# Patient Record
Sex: MALE | Race: WHITE | URBAN - METROPOLITAN AREA
[De-identification: names, ages, dates, MRNs, and addresses within clinical notes are randomized per-mention and may not be internally consistent; named-entity substitution may affect disease eponyms.]

---

## 2017-04-04 RX ORDER — FAMOTIDINE 20 MG
1 TABLET ORAL DAILY
COMMUNITY

## 2017-04-14 NOTE — PHARMACY-ADMISSION MEDICATION HISTORY
Admission medication history interview status for this patient is complete. See [facility] admission navigator for allergy information, prior to admission medications and immunization status.     Med rec completed by pre admitting Kenia Eid RN Tue Apr 4, 2017 10:16 AM           Prior to Admission medications    Medication Sig Last Dose Taking? Auth Provider   AMLODIPINE BESYLATE PO Take 2.5 mg by mouth daily  Yes Reported, Patient   LOSARTAN POTASSIUM PO Take 100 mg by mouth daily  Yes Reported, Patient   METOPROLOL TARTRATE PO Take 25 mg by mouth 2 times daily  Yes Reported, Patient   Acetaminophen (TYLENOL PO) Take 500 mg by mouth every 6 hours as needed for mild pain or fever  Yes Reported, Patient   ASPIRIN EC PO Take 81 mg by mouth daily  Yes Reported, Patient   Vitamin D, Cholecalciferol, 1000 UNITS CAPS Take 1 tablet by mouth daily  Yes Reported, Patient   magnesium 100 MG TABS Take 1 tablet by mouth daily  Yes Reported, Patient   RaNITidine HCl (ZANTAC PO) Take 150 mg by mouth At Bedtime  Yes Reported, Patient   ROPINIROLE HCL PO Take 1 mg by mouth 3 times daily  Yes Reported, Patient

## 2017-04-20 ENCOUNTER — ANESTHESIA (OUTPATIENT)
Dept: SURGERY | Facility: CLINIC | Age: 67
DRG: 454 | End: 2017-04-20
Payer: COMMERCIAL

## 2017-04-20 ENCOUNTER — HOSPITAL ENCOUNTER (INPATIENT)
Facility: CLINIC | Age: 67
LOS: 1 days | Discharge: HOME | DRG: 454 | End: 2017-04-21
Attending: NEUROLOGICAL SURGERY | Admitting: NEUROLOGICAL SURGERY
Payer: COMMERCIAL

## 2017-04-20 ENCOUNTER — ANESTHESIA EVENT (OUTPATIENT)
Dept: SURGERY | Facility: CLINIC | Age: 67
DRG: 454 | End: 2017-04-20
Payer: COMMERCIAL

## 2017-04-20 ENCOUNTER — APPOINTMENT (OUTPATIENT)
Dept: GENERAL RADIOLOGY | Facility: CLINIC | Age: 67
DRG: 454 | End: 2017-04-20
Attending: NEUROLOGICAL SURGERY
Payer: COMMERCIAL

## 2017-04-20 DIAGNOSIS — M41.9 SCOLIOSIS OF LUMBOSACRAL SPINE, UNSPECIFIED SCOLIOSIS TYPE: Primary | ICD-10-CM

## 2017-04-20 DIAGNOSIS — G89.18 ACUTE POST-OPERATIVE PAIN: ICD-10-CM

## 2017-04-20 LAB
ABO + RH BLD: NORMAL
ABO + RH BLD: NORMAL
BLD GP AB SCN SERPL QL: NORMAL
BLOOD BANK CMNT PATIENT-IMP: NORMAL
SPECIMEN EXP DATE BLD: NORMAL

## 2017-04-20 PROCEDURE — 37000008 ZZH ANESTHESIA TECHNICAL FEE, 1ST 30 MIN: Performed by: NEUROLOGICAL SURGERY

## 2017-04-20 PROCEDURE — 71000013 ZZH RECOVERY PHASE 1 LEVEL 1 EA ADDTL HR: Performed by: NEUROLOGICAL SURGERY

## 2017-04-20 PROCEDURE — 0SG30AJ FUSION OF LUMBOSACRAL JOINT WITH INTERBODY FUSION DEVICE, POSTERIOR APPROACH, ANTERIOR COLUMN, OPEN APPROACH: ICD-10-PCS | Performed by: NEUROLOGICAL SURGERY

## 2017-04-20 PROCEDURE — 25800025 ZZH RX 258: Performed by: ANESTHESIOLOGY

## 2017-04-20 PROCEDURE — 37000009 ZZH ANESTHESIA TECHNICAL FEE, EACH ADDTL 15 MIN: Performed by: NEUROLOGICAL SURGERY

## 2017-04-20 PROCEDURE — 36415 COLL VENOUS BLD VENIPUNCTURE: CPT | Performed by: ANESTHESIOLOGY

## 2017-04-20 PROCEDURE — 0SB40ZZ EXCISION OF LUMBOSACRAL DISC, OPEN APPROACH: ICD-10-PCS | Performed by: NEUROLOGICAL SURGERY

## 2017-04-20 PROCEDURE — 25000128 H RX IP 250 OP 636: Performed by: NEUROLOGICAL SURGERY

## 2017-04-20 PROCEDURE — 25000125 ZZHC RX 250: Performed by: ANESTHESIOLOGY

## 2017-04-20 PROCEDURE — 95938 SOMATOSENSORY TESTING: CPT | Performed by: NEUROLOGICAL SURGERY

## 2017-04-20 PROCEDURE — 25000132 ZZH RX MED GY IP 250 OP 250 PS 637: Mod: GY | Performed by: NEUROLOGICAL SURGERY

## 2017-04-20 PROCEDURE — 0SG30K1 FUSION OF LUMBOSACRAL JOINT WITH NONAUTOLOGOUS TISSUE SUBSTITUTE, POSTERIOR APPROACH, POSTERIOR COLUMN, OPEN APPROACH: ICD-10-PCS | Performed by: NEUROLOGICAL SURGERY

## 2017-04-20 PROCEDURE — 25000128 H RX IP 250 OP 636: Performed by: ANESTHESIOLOGY

## 2017-04-20 PROCEDURE — 07DS3ZZ EXTRACTION OF VERTEBRAL BONE MARROW, PERCUTANEOUS APPROACH: ICD-10-PCS | Performed by: NEUROLOGICAL SURGERY

## 2017-04-20 PROCEDURE — A9270 NON-COVERED ITEM OR SERVICE: HCPCS | Mod: GY | Performed by: NEUROLOGICAL SURGERY

## 2017-04-20 PROCEDURE — 36000069 ZZH SURGERY LEVEL 5 EA 15 ADDTL MIN: Performed by: NEUROLOGICAL SURGERY

## 2017-04-20 PROCEDURE — 40000277 XR SURGERY CARM FLUORO LESS THAN 5 MIN W STILLS: Mod: TC

## 2017-04-20 PROCEDURE — 27210794 ZZH OR GENERAL SUPPLY STERILE: Performed by: NEUROLOGICAL SURGERY

## 2017-04-20 PROCEDURE — S0020 INJECTION, BUPIVICAINE HYDRO: HCPCS | Performed by: NEUROLOGICAL SURGERY

## 2017-04-20 PROCEDURE — 3E0S33Z INTRODUCTION OF ANTI-INFLAMMATORY INTO EPIDURAL SPACE, PERCUTANEOUS APPROACH: ICD-10-PCS | Performed by: NEUROLOGICAL SURGERY

## 2017-04-20 PROCEDURE — 0SG00A0 FUSION OF LUMBAR VERTEBRAL JOINT WITH INTERBODY FUSION DEVICE, ANTERIOR APPROACH, ANTERIOR COLUMN, OPEN APPROACH: ICD-10-PCS | Performed by: NEUROLOGICAL SURGERY

## 2017-04-20 PROCEDURE — 25000128 H RX IP 250 OP 636: Performed by: NURSE ANESTHETIST, CERTIFIED REGISTERED

## 2017-04-20 PROCEDURE — C1713 ANCHOR/SCREW BN/BN,TIS/BN: HCPCS | Performed by: NEUROLOGICAL SURGERY

## 2017-04-20 PROCEDURE — 86850 RBC ANTIBODY SCREEN: CPT | Performed by: ANESTHESIOLOGY

## 2017-04-20 PROCEDURE — 71000012 ZZH RECOVERY PHASE 1 LEVEL 1 FIRST HR: Performed by: NEUROLOGICAL SURGERY

## 2017-04-20 PROCEDURE — 27210995 ZZH RX 272: Performed by: NEUROLOGICAL SURGERY

## 2017-04-20 PROCEDURE — 40000306 ZZH STATISTIC PRE PROC ASSESS II: Performed by: NEUROLOGICAL SURGERY

## 2017-04-20 PROCEDURE — 25000125 ZZHC RX 250: Performed by: NURSE ANESTHETIST, CERTIFIED REGISTERED

## 2017-04-20 PROCEDURE — A9270 NON-COVERED ITEM OR SERVICE: HCPCS | Mod: GY | Performed by: PHYSICIAN ASSISTANT

## 2017-04-20 PROCEDURE — 27211024 ZZHC OR SUPPLY OTHER OPNP: Performed by: NEUROLOGICAL SURGERY

## 2017-04-20 PROCEDURE — 27810322 ZZHC OR SPINE - CAGE/SPACER/DISK/CORD/CONNECTOR OPNP: Performed by: NEUROLOGICAL SURGERY

## 2017-04-20 PROCEDURE — 25000125 ZZHC RX 250: Performed by: NEUROLOGICAL SURGERY

## 2017-04-20 PROCEDURE — 86900 BLOOD TYPING SEROLOGIC ABO: CPT | Performed by: ANESTHESIOLOGY

## 2017-04-20 PROCEDURE — 0SG00K1 FUSION OF LUMBAR VERTEBRAL JOINT WITH NONAUTOLOGOUS TISSUE SUBSTITUTE, POSTERIOR APPROACH, POSTERIOR COLUMN, OPEN APPROACH: ICD-10-PCS | Performed by: NEUROLOGICAL SURGERY

## 2017-04-20 PROCEDURE — 0QP004Z REMOVAL OF INTERNAL FIXATION DEVICE FROM LUMBAR VERTEBRA, OPEN APPROACH: ICD-10-PCS | Performed by: NEUROLOGICAL SURGERY

## 2017-04-20 PROCEDURE — 0SB20ZZ EXCISION OF LUMBAR VERTEBRAL DISC, OPEN APPROACH: ICD-10-PCS | Performed by: NEUROLOGICAL SURGERY

## 2017-04-20 PROCEDURE — 25000132 ZZH RX MED GY IP 250 OP 250 PS 637: Mod: GY | Performed by: PHYSICIAN ASSISTANT

## 2017-04-20 PROCEDURE — 86901 BLOOD TYPING SEROLOGIC RH(D): CPT | Performed by: ANESTHESIOLOGY

## 2017-04-20 PROCEDURE — 36000071 ZZH SURGERY LEVEL 5 W FLUORO 1ST 30 MIN: Performed by: NEUROLOGICAL SURGERY

## 2017-04-20 PROCEDURE — 12000007 ZZH R&B INTERMEDIATE

## 2017-04-20 PROCEDURE — 25000566 ZZH SEVOFLURANE, EA 15 MIN: Performed by: NEUROLOGICAL SURGERY

## 2017-04-20 PROCEDURE — 95870 NDL EMG LMTD STD MUSC 1 XTR: CPT | Performed by: NEUROLOGICAL SURGERY

## 2017-04-20 DEVICE — IMPLANTABLE DEVICE: Type: IMPLANTABLE DEVICE | Site: SPINE LUMBAR | Status: FUNCTIONAL

## 2017-04-20 RX ORDER — ONDANSETRON 2 MG/ML
4 INJECTION INTRAMUSCULAR; INTRAVENOUS EVERY 30 MIN PRN
Status: DISCONTINUED | OUTPATIENT
Start: 2017-04-20 | End: 2017-04-20 | Stop reason: HOSPADM

## 2017-04-20 RX ORDER — ROPINIROLE 1 MG/1
1 TABLET, FILM COATED ORAL 3 TIMES DAILY
Status: DISCONTINUED | OUTPATIENT
Start: 2017-04-20 | End: 2017-04-21 | Stop reason: HOSPADM

## 2017-04-20 RX ORDER — FENTANYL CITRATE 50 UG/ML
25-50 INJECTION, SOLUTION INTRAMUSCULAR; INTRAVENOUS
Status: DISCONTINUED | OUTPATIENT
Start: 2017-04-20 | End: 2017-04-20 | Stop reason: HOSPADM

## 2017-04-20 RX ORDER — LIDOCAINE 40 MG/G
CREAM TOPICAL
Status: DISCONTINUED | OUTPATIENT
Start: 2017-04-20 | End: 2017-04-21 | Stop reason: HOSPADM

## 2017-04-20 RX ORDER — ONDANSETRON 4 MG/1
4 TABLET, ORALLY DISINTEGRATING ORAL EVERY 30 MIN PRN
Status: DISCONTINUED | OUTPATIENT
Start: 2017-04-20 | End: 2017-04-20 | Stop reason: HOSPADM

## 2017-04-20 RX ORDER — KETOROLAC TROMETHAMINE 15 MG/ML
15 INJECTION, SOLUTION INTRAMUSCULAR; INTRAVENOUS EVERY 6 HOURS
Status: DISCONTINUED | OUTPATIENT
Start: 2017-04-20 | End: 2017-04-21 | Stop reason: HOSPADM

## 2017-04-20 RX ORDER — HYDROMORPHONE HYDROCHLORIDE 1 MG/ML
.5-1 INJECTION, SOLUTION INTRAMUSCULAR; INTRAVENOUS; SUBCUTANEOUS
Status: DISCONTINUED | OUTPATIENT
Start: 2017-04-20 | End: 2017-04-21 | Stop reason: HOSPADM

## 2017-04-20 RX ORDER — NALOXONE HYDROCHLORIDE 0.4 MG/ML
.1-.4 INJECTION, SOLUTION INTRAMUSCULAR; INTRAVENOUS; SUBCUTANEOUS
Status: DISCONTINUED | OUTPATIENT
Start: 2017-04-20 | End: 2017-04-21 | Stop reason: HOSPADM

## 2017-04-20 RX ORDER — METOPROLOL TARTRATE 25 MG/1
25 TABLET, FILM COATED ORAL 2 TIMES DAILY
Status: DISCONTINUED | OUTPATIENT
Start: 2017-04-20 | End: 2017-04-21

## 2017-04-20 RX ORDER — ONDANSETRON 2 MG/ML
INJECTION INTRAMUSCULAR; INTRAVENOUS PRN
Status: DISCONTINUED | OUTPATIENT
Start: 2017-04-20 | End: 2017-04-20

## 2017-04-20 RX ORDER — NEOSTIGMINE METHYLSULFATE 1 MG/ML
VIAL (ML) INJECTION PRN
Status: DISCONTINUED | OUTPATIENT
Start: 2017-04-20 | End: 2017-04-20

## 2017-04-20 RX ORDER — FENTANYL CITRATE 50 UG/ML
INJECTION, SOLUTION INTRAMUSCULAR; INTRAVENOUS PRN
Status: DISCONTINUED | OUTPATIENT
Start: 2017-04-20 | End: 2017-04-20

## 2017-04-20 RX ORDER — PROPOFOL 10 MG/ML
INJECTION, EMULSION INTRAVENOUS PRN
Status: DISCONTINUED | OUTPATIENT
Start: 2017-04-20 | End: 2017-04-20

## 2017-04-20 RX ORDER — DEXAMETHASONE SODIUM PHOSPHATE 4 MG/ML
INJECTION, SOLUTION INTRA-ARTICULAR; INTRALESIONAL; INTRAMUSCULAR; INTRAVENOUS; SOFT TISSUE PRN
Status: DISCONTINUED | OUTPATIENT
Start: 2017-04-20 | End: 2017-04-20

## 2017-04-20 RX ORDER — EPHEDRINE SULFATE 50 MG/ML
INJECTION, SOLUTION INTRAMUSCULAR; INTRAVENOUS; SUBCUTANEOUS PRN
Status: DISCONTINUED | OUTPATIENT
Start: 2017-04-20 | End: 2017-04-20

## 2017-04-20 RX ORDER — CYCLOBENZAPRINE HCL 5 MG
5 TABLET ORAL 3 TIMES DAILY PRN
Qty: 42 TABLET | Refills: 3 | Status: SHIPPED | OUTPATIENT
Start: 2017-04-20

## 2017-04-20 RX ORDER — LIDOCAINE 40 MG/G
CREAM TOPICAL
Status: DISCONTINUED | OUTPATIENT
Start: 2017-04-20 | End: 2017-04-20 | Stop reason: HOSPADM

## 2017-04-20 RX ORDER — SODIUM CHLORIDE, SODIUM LACTATE, POTASSIUM CHLORIDE, CALCIUM CHLORIDE 600; 310; 30; 20 MG/100ML; MG/100ML; MG/100ML; MG/100ML
INJECTION, SOLUTION INTRAVENOUS CONTINUOUS
Status: DISCONTINUED | OUTPATIENT
Start: 2017-04-20 | End: 2017-04-20 | Stop reason: HOSPADM

## 2017-04-20 RX ORDER — CEFAZOLIN SODIUM 2 G/100ML
2 INJECTION, SOLUTION INTRAVENOUS
Status: COMPLETED | OUTPATIENT
Start: 2017-04-20 | End: 2017-04-20

## 2017-04-20 RX ORDER — OXYCODONE HYDROCHLORIDE 5 MG/1
5-10 TABLET ORAL EVERY 4 HOURS PRN
Status: DISCONTINUED | OUTPATIENT
Start: 2017-04-20 | End: 2017-04-21

## 2017-04-20 RX ORDER — HYDROMORPHONE HYDROCHLORIDE 1 MG/ML
.3-.5 INJECTION, SOLUTION INTRAMUSCULAR; INTRAVENOUS; SUBCUTANEOUS EVERY 5 MIN PRN
Status: DISCONTINUED | OUTPATIENT
Start: 2017-04-20 | End: 2017-04-20 | Stop reason: HOSPADM

## 2017-04-20 RX ORDER — ONDANSETRON 2 MG/ML
4 INJECTION INTRAMUSCULAR; INTRAVENOUS EVERY 6 HOURS PRN
Status: DISCONTINUED | OUTPATIENT
Start: 2017-04-20 | End: 2017-04-21 | Stop reason: HOSPADM

## 2017-04-20 RX ORDER — PROPOFOL 10 MG/ML
INJECTION, EMULSION INTRAVENOUS CONTINUOUS PRN
Status: DISCONTINUED | OUTPATIENT
Start: 2017-04-20 | End: 2017-04-20

## 2017-04-20 RX ORDER — LOSARTAN POTASSIUM 100 MG/1
100 TABLET ORAL DAILY
Status: DISCONTINUED | OUTPATIENT
Start: 2017-04-21 | End: 2017-04-21 | Stop reason: HOSPADM

## 2017-04-20 RX ORDER — GLYCOPYRROLATE 0.2 MG/ML
INJECTION, SOLUTION INTRAMUSCULAR; INTRAVENOUS PRN
Status: DISCONTINUED | OUTPATIENT
Start: 2017-04-20 | End: 2017-04-20

## 2017-04-20 RX ORDER — AMLODIPINE BESYLATE 2.5 MG/1
2.5 TABLET ORAL DAILY
Status: DISCONTINUED | OUTPATIENT
Start: 2017-04-21 | End: 2017-04-21 | Stop reason: HOSPADM

## 2017-04-20 RX ORDER — BUPIVACAINE HYDROCHLORIDE 7.5 MG/ML
INJECTION, SOLUTION EPIDURAL; RETROBULBAR PRN
Status: DISCONTINUED | OUTPATIENT
Start: 2017-04-20 | End: 2017-04-20 | Stop reason: HOSPADM

## 2017-04-20 RX ORDER — LABETALOL HYDROCHLORIDE 5 MG/ML
10 INJECTION, SOLUTION INTRAVENOUS
Status: DISCONTINUED | OUTPATIENT
Start: 2017-04-20 | End: 2017-04-20 | Stop reason: HOSPADM

## 2017-04-20 RX ORDER — SODIUM CHLORIDE AND POTASSIUM CHLORIDE 150; 450 MG/100ML; MG/100ML
INJECTION, SOLUTION INTRAVENOUS CONTINUOUS
Status: DISCONTINUED | OUTPATIENT
Start: 2017-04-20 | End: 2017-04-21

## 2017-04-20 RX ORDER — OXYCODONE HYDROCHLORIDE 5 MG/1
5-10 TABLET ORAL EVERY 4 HOURS PRN
Qty: 60 TABLET | Refills: 0 | Status: SHIPPED | OUTPATIENT
Start: 2017-04-20 | End: 2017-04-21

## 2017-04-20 RX ORDER — ACETAMINOPHEN 325 MG/1
650 TABLET ORAL EVERY 4 HOURS PRN
Status: DISCONTINUED | OUTPATIENT
Start: 2017-04-23 | End: 2017-04-21

## 2017-04-20 RX ORDER — ONDANSETRON 4 MG/1
4 TABLET, ORALLY DISINTEGRATING ORAL EVERY 6 HOURS PRN
Status: DISCONTINUED | OUTPATIENT
Start: 2017-04-20 | End: 2017-04-21 | Stop reason: HOSPADM

## 2017-04-20 RX ORDER — ACETAMINOPHEN 325 MG/1
975 TABLET ORAL EVERY 8 HOURS
Status: DISCONTINUED | OUTPATIENT
Start: 2017-04-20 | End: 2017-04-21

## 2017-04-20 RX ORDER — NICOTINE 21 MG/24HR
1 PATCH, TRANSDERMAL 24 HOURS TRANSDERMAL EVERY 24 HOURS
Status: DISCONTINUED | OUTPATIENT
Start: 2017-04-20 | End: 2017-04-21 | Stop reason: HOSPADM

## 2017-04-20 RX ORDER — CEFAZOLIN SODIUM 1 G/3ML
1 INJECTION, POWDER, FOR SOLUTION INTRAMUSCULAR; INTRAVENOUS SEE ADMIN INSTRUCTIONS
Status: DISCONTINUED | OUTPATIENT
Start: 2017-04-20 | End: 2017-04-20 | Stop reason: HOSPADM

## 2017-04-20 RX ORDER — CYCLOBENZAPRINE HCL 5 MG
5 TABLET ORAL 3 TIMES DAILY PRN
Status: DISCONTINUED | OUTPATIENT
Start: 2017-04-20 | End: 2017-04-21 | Stop reason: HOSPADM

## 2017-04-20 RX ADMIN — FENTANYL CITRATE 50 MCG: 50 INJECTION INTRAMUSCULAR; INTRAVENOUS at 15:27

## 2017-04-20 RX ADMIN — SODIUM CHLORIDE, POTASSIUM CHLORIDE, SODIUM LACTATE AND CALCIUM CHLORIDE: 600; 310; 30; 20 INJECTION, SOLUTION INTRAVENOUS at 16:32

## 2017-04-20 RX ADMIN — GLYCOPYRROLATE 0.2 MG: 0.2 INJECTION, SOLUTION INTRAMUSCULAR; INTRAVENOUS at 12:26

## 2017-04-20 RX ADMIN — MIDAZOLAM HYDROCHLORIDE 2 MG: 1 INJECTION, SOLUTION INTRAMUSCULAR; INTRAVENOUS at 12:16

## 2017-04-20 RX ADMIN — Medication 5 MG: at 12:39

## 2017-04-20 RX ADMIN — Medication 3 MG: at 14:33

## 2017-04-20 RX ADMIN — SUCCINYLCHOLINE CHLORIDE 100 MG: 20 INJECTION, SOLUTION INTRAMUSCULAR; INTRAVENOUS at 12:26

## 2017-04-20 RX ADMIN — HYDROMORPHONE HYDROCHLORIDE 0.3 MG: 1 INJECTION, SOLUTION INTRAMUSCULAR; INTRAVENOUS; SUBCUTANEOUS at 16:23

## 2017-04-20 RX ADMIN — Medication 5 MG: at 12:44

## 2017-04-20 RX ADMIN — PROPOFOL 160 MG: 10 INJECTION, EMULSION INTRAVENOUS at 12:26

## 2017-04-20 RX ADMIN — FENTANYL CITRATE 50 MCG: 50 INJECTION, SOLUTION INTRAMUSCULAR; INTRAVENOUS at 13:37

## 2017-04-20 RX ADMIN — CEFAZOLIN SODIUM 2 G: 2 INJECTION, SOLUTION INTRAVENOUS at 12:16

## 2017-04-20 RX ADMIN — RANITIDINE HYDROCHLORIDE 150 MG: 150 TABLET, FILM COATED ORAL at 21:03

## 2017-04-20 RX ADMIN — HYDROMORPHONE HYDROCHLORIDE 0.5 MG: 1 INJECTION, SOLUTION INTRAMUSCULAR; INTRAVENOUS; SUBCUTANEOUS at 14:41

## 2017-04-20 RX ADMIN — ONDANSETRON 4 MG: 2 INJECTION INTRAMUSCULAR; INTRAVENOUS at 12:38

## 2017-04-20 RX ADMIN — Medication 5 MG: at 12:25

## 2017-04-20 RX ADMIN — ROCURONIUM BROMIDE 15 MG: 10 INJECTION INTRAVENOUS at 13:39

## 2017-04-20 RX ADMIN — GLYCOPYRROLATE 0.4 MG: 0.2 INJECTION, SOLUTION INTRAMUSCULAR; INTRAVENOUS at 14:33

## 2017-04-20 RX ADMIN — NICOTINE 1 PATCH: 21 PATCH, EXTENDED RELEASE TRANSDERMAL at 21:04

## 2017-04-20 RX ADMIN — DEXAMETHASONE SODIUM PHOSPHATE 4 MG: 4 INJECTION, SOLUTION INTRA-ARTICULAR; INTRALESIONAL; INTRAMUSCULAR; INTRAVENOUS; SOFT TISSUE at 12:26

## 2017-04-20 RX ADMIN — ACETAMINOPHEN 975 MG: 325 TABLET, FILM COATED ORAL at 17:56

## 2017-04-20 RX ADMIN — SODIUM CHLORIDE, POTASSIUM CHLORIDE, SODIUM LACTATE AND CALCIUM CHLORIDE: 600; 310; 30; 20 INJECTION, SOLUTION INTRAVENOUS at 12:16

## 2017-04-20 RX ADMIN — KETOROLAC TROMETHAMINE 15 MG: 15 INJECTION, SOLUTION INTRAMUSCULAR; INTRAVENOUS at 17:56

## 2017-04-20 RX ADMIN — SODIUM CHLORIDE, POTASSIUM CHLORIDE, SODIUM LACTATE AND CALCIUM CHLORIDE: 600; 310; 30; 20 INJECTION, SOLUTION INTRAVENOUS at 13:58

## 2017-04-20 RX ADMIN — ATROPINE SULFATE 0.4 MG: 0.4 INJECTION, SOLUTION INTRAMUSCULAR; INTRAVENOUS; SUBCUTANEOUS at 16:04

## 2017-04-20 RX ADMIN — MAGNESIUM 64 MG (MAGNESIUM CHLORIDE) TABLET,DELAYED RELEASE 535 MG: at 19:57

## 2017-04-20 RX ADMIN — ROPINIROLE HYDROCHLORIDE 1 MG: 1 TABLET, FILM COATED ORAL at 19:57

## 2017-04-20 RX ADMIN — POTASSIUM CHLORIDE AND SODIUM CHLORIDE: 450; 150 INJECTION, SOLUTION INTRAVENOUS at 19:57

## 2017-04-20 RX ADMIN — HYDROMORPHONE HYDROCHLORIDE 0.5 MG: 1 INJECTION, SOLUTION INTRAMUSCULAR; INTRAVENOUS; SUBCUTANEOUS at 14:51

## 2017-04-20 RX ADMIN — FENTANYL CITRATE 150 MCG: 50 INJECTION, SOLUTION INTRAMUSCULAR; INTRAVENOUS at 12:26

## 2017-04-20 RX ADMIN — CEFAZOLIN SODIUM 1 G: 1 INJECTION, SOLUTION INTRAVENOUS at 19:57

## 2017-04-20 RX ADMIN — PROPOFOL 50 MCG/KG/MIN: 10 INJECTION, EMULSION INTRAVENOUS at 12:36

## 2017-04-20 RX ADMIN — Medication 10 MG: at 12:27

## 2017-04-20 NOTE — OR NURSING
Dr. Greenberg, [facility] consulted for low heart rate of upper 30's.  Blood pressure at that time was 99/45.  Patient had complaints of feeling dizzy.  Atropine 0.4 mg iv given.  Will continue to monitor.

## 2017-04-20 NOTE — OP NOTE
REPORT OF OPERATION  Cody Mac is a 66 year old old male admitted on 4/20/2017  9:17 AM.  ?  Operative Date:  4/20/2017  PRE-PROCEDURE DIAGNOSIS:  1) L4/5/S1  degenerative disc disease.  POST-PROCEDURE DIAGNOSIS:  1) Same as above + scoliosis   PROCEDURE PERFORMED:  1) L4/5/S1 oblique lateral lumbar interbody fusion with discectomy, preparation of the endplate and placement of a bullet cage packed with calcium triphosphate anterior to the transverse process in modified prone position, with intraoperative biplanar fluoroscopic imaging and electrophysiological monitoring.  2) L5 and S1  Posterior minimally invasive pedicle screw placement and posterolateral instrumentation and fusion with  intraoperative biplanar fluoroscopic imaging and electrophysiological monitoring.  3) Open exploration of  L3/4  previous fusionremoval of bev  Replacement of screws in L4and connection to instrumentation as mentiond above for L4-S1 posterolateral and interbody fusion   4) Epidural steroid injection.  5) Transpedicular Bone marrow aspiration    Surgeon: Murray Barrios MD  ASSISTANT: Wilberto Campbell DO    HISTORY: Please refer to my clinic note for full details, but in short, patient is a 66 -year-old female with severe back pain and radiculopathy not responding to usual conservative therapy. Patient was set up for the surgery as mentioned above and was taken to surgery as mentioned above after all risks and benefits were explained.  PROCEDURE:  The patient was taken to surgery. After general anesthesia was applied, SCDs and Holguin placed and preoperative antibiotic given, then patient has been positioned on the Clif table and Ivan frame in a modified prone position for ease of access from the left side.  AP and lateral fluoroscopic images are positioned. Patient has been prepped and draped in sterile fashion. The landmarks, including Spinal process, transverse process, disk space, endplates and pedicels are identified and  marked.  Following steps are then taken for levels:  L4/5  Cage size 11 mm high and 33 mm long titanium   L5/S1Cage size 19 mm high and 33 mm long titanium     A Jamshidi needle is place in upper right pedicle inside of the vertebral body and bone marrow has been aspirated to be mixed with biologics to intruduce  Stem cells to the biologics.    The patient was turned using the rotation of the surgical table so that a near direct anterior-lateral approach to the lumbar spine could be achieved. A 10mm stab incision was then made superior to the mid iliac crest and then using biplanar fluoroscopic visualization, under electrophysiological monitoring and stimulation, we introduced an electrophysiological probe through the retro peritoneal space into the desired discs anterior to the transverse processes and then passed it into the disc space after finding a silent window. The sleeve is retained and the probe is removed, then a K wire was passed sequentially into the disk space. A dilating tube was then passed along this same route. Following this, a 10 mm working channel was then passed sequentially into the disc spaces. The working channel was manually held in position while a series of disc cleaning tools was passed through the channel to remove the affected discs, decompress the nerve roots, and decorticate the vertebral endplates at those segments.  ?  Arthrodesis of the intervertebral spaces via an anterior retroperitoneal exposure and application of an intervertebral biomechanical device was then accomplished by using the working channel that had been placed in the retroperitoneal space anterior to the transverse processes. After adequate decompression and preparation of the endplates, we then put calcium triphosphate anterior into disc space and then a PEEK interbody was packed tightly with allograft bone for stabilization and arthrodesis of the intervertebral spaces and inserted into the mid portion of the  intervertebral discs. This was done under biplanar fluoroscopic guidance. All bone was confined to the borders of the disc space. The working channel was then removed.  ?  Following steps are then taken for levels:  L4 Screw size Right 50 Left 50  L5 Screw size Right 50 Left 50  Then patient is rotated for a true prone position. Then entry point for the pedicles is identified in the AP and lateral view, and then skin incision has been injected with local anesthetic. Then we entered the pedicle with a Jamshidi needle. Over the Jamshidi needle, we introduced the K wire in Vertebral body. Additionally we use a small periostal elevator along the screws to refresh the surface of the bone and facet and put minimal amount of Calcium-triphoisphate for additional posterolateral fusion. Over the K wire then , we dilate the muscle with the dilator and then put a pedicle screws bilaterally. Screws are all silent up to  25 MA of stimulation. After screws are all placed,  At this time we remove AP c-arm and after injecting Midline incision with local anesthetic we open it with scalpel and put retractors in and go down and identify the previous hardware in L3/4, Then remove the caps and the bev,    following screws are replaced   L3  Ir removed  Since fusion is solid    L4 with 7.5mm  mm long 50 high top   Then we pass a long bev from superior and pass through MIS screws and enter the open field and pass through the previous screws.   In MIS area each incision has been closed with 2-0 Vicryl suture and then in open area we irrigate the incision with abx solution then Put a medium Hemovac, then close the skin with 0-vicryl and skin with 2-0 vicryl And then Skin has been stapled  Before the end of the surgery, we injected 40mg Kenalog and 1 cc 0.25% Marcaine for epidural steroid injection in epidural space under fluoroscopic imaging after we introduced the spinal needle and confirmed with injecting 2cc air and aspiration which  confirms we are in the epidural space and no CSF is returned.  Additional finding: sidebending with scoliosis is noted in ap view in surgery of 7-10 degree   Estimated blood: 150cc.  ?  DISPOSITION: To PACU with postoperative antibiotic. All counts are correct at the end of the surgery.  Murray Barrios MD  cc: Murray Barrios MD  ?  ?  ?  ?  ?

## 2017-04-20 NOTE — OR NURSING
Patient's heart rate mid 40's with a blood pressure of 94/52.  Per ASPEN Carlisle, patient ok to transfer to floor.  Admitting heart rate was 48.

## 2017-04-20 NOTE — OR NURSING
Patient's heart rate right prior to transfer to 6th floor is back down to 40 at times, but mainly low 40's.  Blood pressure stable in low-mid 90's/60.  Dr. Greenberg states patient still ok to transfer to floor.  No further interventions.

## 2017-04-20 NOTE — ANESTHESIA CARE TRANSFER NOTE
Patient: Cody Mac    Procedure(s):  L4-S1 OLIF with Open Posterior Fusion  - Wound Class: I-Clean    Diagnosis: DDD, HNP, Stenosis   Diagnosis Additional Information: No value filed.    Anesthesia Type:   General     Note:  Airway :Face Mask  Patient transferred to:PACU        Vitals: (Last set prior to Anesthesia Care Transfer)    CRNA VITALS  4/20/2017 1420 - 4/20/2017 1456      4/20/2017             Pulse: 77    SpO2: 100 %    Resp Rate (observed): 15                Electronically Signed By: ZENA Gaviria CRNA  April 20, 2017  2:56 PM

## 2017-04-20 NOTE — ANESTHESIA PREPROCEDURE EVALUATION
Anesthesia Evaluation     . Pt has had prior anesthetic. Type: General           ROS/MED HX    ENT/Pulmonary:  - neg pulmonary ROS     Neurologic:     (+)other neuro restless leg syndrome    Cardiovascular:     (+) hypertension----. : . . . :. .       METS/Exercise Tolerance:     Hematologic:  - neg hematologic  ROS       Musculoskeletal:   (+) , , other musculoskeletal- lumbago      GI/Hepatic:  - neg GI/hepatic ROS       Renal/Genitourinary:  - ROS Renal section negative       Endo:  - neg endo ROS       Psychiatric:  - neg psychiatric ROS       Infectious Disease:  - neg infectious disease ROS       Malignancy:      - no malignancy   Other:    (+) No chance of pregnancy C-spine cleared: N/A, H/O Chronic Pain,no other significant disability                    Physical Exam  Normal systems: cardiovascular, pulmonary and dental    Airway   Mallampati: II  TM distance: >3 FB  Neck ROM: full    Dental     Cardiovascular       Pulmonary                     Anesthesia Plan      History & Physical Review  History and physical reviewed and following examination; no interval change.    ASA Status:  2 .    NPO Status:  > 8 hours    Plan for General with Intravenous induction. Maintenance will be Balanced.    PONV prophylaxis:  Ondansetron (or other 5HT-3) and Dexamethasone or Solumedrol       Postoperative Care  Postoperative pain management:  IV analgesics.      Consents  Anesthetic plan, risks, benefits and alternatives discussed with:  Patient.  Use of blood products discussed: Yes.   Use of blood products discussed with Patient.  Consented to blood products.  .                          .

## 2017-04-20 NOTE — OR NURSING
Hardware explanted ( 4 screws, 2 rods, and end caps ) given to management, GINA Eng , nurse ayesha.  Alyx CLEMENS

## 2017-04-20 NOTE — ANESTHESIA POSTPROCEDURE EVALUATION
Patient: Cody Mac    Procedure(s):  L4-S1 OLIF with Open Posterior Fusion  - Wound Class: I-Clean    Diagnosis:DDD, HNP, Stenosis   Diagnosis Additional Information: No value filed.    Anesthesia Type:  General    Note:  Anesthesia Post Evaluation    Patient location during evaluation: PACU  Patient participation: Able to fully participate in evaluation  Level of consciousness: awake  Pain management: adequate  Airway patency: patent  Cardiovascular status: acceptable  Respiratory status: acceptable  Hydration status: acceptable  PONV: none             Last vitals:  Vitals:    04/20/17 0929 04/20/17 0930   BP:  120/51   Pulse:  52   Resp:  16   Temp: 98.2  F (36.8  C)    SpO2:  98%         Electronically Signed By: Gumaro Gonzalez MD  April 20, 2017  2:58 PM

## 2017-04-20 NOTE — ANESTHESIA POSTPROCEDURE EVALUATION
Patient: Coyd Mac    Procedure(s):  L4-S1 OLIF with Open Posterior Fusion  - Wound Class: I-Clean    Diagnosis:DDD, HNP, Stenosis   Diagnosis Additional Information: No value filed.    Anesthesia Type:  General    Note:  Anesthesia Post Evaluation    Patient location during evaluation: PACU  Patient participation: Able to fully participate in evaluation  Level of consciousness: awake  Pain management: adequate  Airway patency: patent  Cardiovascular status: acceptable  Respiratory status: acceptable  Hydration status: acceptable  PONV: none             Last vitals:  Vitals:    04/20/17 0929 04/20/17 0930   BP:  120/51   Pulse:  52   Resp:  16   Temp: 98.2  F (36.8  C)    SpO2:  98%         Electronically Signed By: Gumaro Gonzalez MD  April 20, 2017  2:59 PM

## 2017-04-20 NOTE — PLAN OF CARE
Problem: Goal Outcome Summary  Goal: Goal Outcome Summary     PT- Orders received.  Pt still not up to floor at scheduled PT time.  Will defer to nsg to attempt dangle at EOB this date.  Will re-schedule PT eval tomorrow.

## 2017-04-20 NOTE — IP AVS SNAPSHOT
MRN:[id number]                      After Visit Summary   4/20/2017    Cody Mac    MRN: [id number]           Thank you!     Thank you for choosing [facility] for your care. Our goal is always to provide you with excellent care. Hearing back from our patients is one way we can continue to improve our services. Please take a few minutes to complete the written survey that you may receive in the mail after you visit. If you would like to speak to someone directly about your visit please contact Patient Relations at [phone]. Thank you!          Patient Information     Date Of Birth          1950        About your hospital stay     You were admitted on:  April 20, 2017 You last received care in the:  [facility]    You were discharged on:  April 21, 2017        Reason for your hospital stay       Spine surgery                  Who to Call     For medical emergencies, please call 311.  For non-urgent questions about your medical care, please call your primary care provider or clinic, [phone]  For questions related to your surgery, please call your surgery clinic        Attending Provider     Provider Specialty    Murray Barrios MD Neurosurgery       Primary Care Provider Office Phone # Fax #    Yoli Alatorre [phone]       [facility] [address] [place] [address]        After Care Instructions     Activity       Your activity upon discharge: Ad constance within following limitations:  No excessive activities   No Bending, Twisting, climbing, Crawling,   No lifting more than 8 lb for 2 weeks, or 15 lb for 2 months or 25 lb for 4 months or 35 lb for 6 months  Brace for riding cars for 4-6 months            Diet       Follow this diet upon discharge: resume prior to admission diet            Discharge Instructions       Refer to [facility] spine handbood or online at [email or web address]  Or print it  for patient at [email or web address]                  Follow-up Appointments     Follow-up and recommended labs and tests        Follow up in 2 weeks with me or PCP for wound check ( patient's choice) if patients want to go to PCP for wound check, then f/u in my office  With one month                  Further instructions from your care team       Opioid Medication Information    You have been given a prescription for an opioid (narcotic) pain medicine and/or have received a pain medicine. These medicines can make you drowsy or impaired. You must not drive, operate dangerous equipment, or engage in any other dangerous activities while taking these medications. If you drive while taking these medications, you could be arrested for DUI, or driving under the influence. Do not drink any alcohol while you are taking these medications.   Opioid pain medications can cause addiction. If you have a history of chemical dependency of any type, you are at a higher risk of becoming addicted to pain medications.  Only take these prescribed medications to treat your pain when all other options have been tried. Take it for as short a time and as few doses as possible. Store your pain pills in a secure place, as they are frequently stolen and provide a dangerous opportunity for children or visitors in your house to start abusing these powerful medications. We will not replace any lost or stolen medicine.  As soon as your pain is better, you should seek out a drug take back program (see your local police department) to dispose of them.   Over-the-counter medications and prescription drugs can pollute [place] and be harmful to humans, fish, and other wildlife when disposed of improperly -- do not flush medications down the toilet or place in the trash.  Properly disposing of medicines is important to prevent abuse or poisoning and protect the environment.     Prescription and  over-the-counter medications are collected anonymously from residents for free at [facility] drop-off locations. Visit the [facility]'s Office Prescription Drug Drop-Off page for the list of drop-off sites and information about the program.       [place]  Police Department [phone] Mon-Fri  8am to 4:30pm     [place]  Police Department [phone] 24hrs a day    [place]  Police Department [phone] Mon-Fri  8am to 6:00pm     [place]  Police Department [phone] Mon-Fri  8am to 4:30pm     [place]  Police Department [phone] 24hrs a day      [place]  Police Department [phone] Mon-Fri  8am to 4:30pm   Many prescription pain medications contain Tylenol  (acetaminophen), including Vicodin , Tylenol #3 , Norco , Lortab , and Percocet .  You should not take any extra pills of Tylenol  if you are using these prescription medications or you can get very sick.  Do not ever take more than 3000 mg of acetaminophen in any 24 hour period.  All opioids tend to cause constipation. Drink plenty of water and eat foods that have a lot of fiber, such as fruits, vegetables, prune juice, apple juice and high fiber cereal.  Take a laxative if you don t move your bowels at least every other day. Miralax , Milk of Magnesia, Colace , or Senna  can be used to keep you regular.  You will likely need to continue stool softeners and stimulants while taking opioids.       Pending Results     No orders found for last 3 day(s).            Statement of Approval     Ordered          04/21/17 0736  I have reviewed and agree with all the recommendations and orders detailed in this document.  EFFECTIVE NOW     Approved and electronically signed by:  Murray Barrios MD             Admission Information     Date & Time Provider Department Dept. Phone    4/20/2017 Murray Barrios MD [facility] [phone]      Your Vitals Were     Blood Pressure Pulse Temperature Respirations  "Height Weight    146/61 (BP Location: Left arm) 53 98.7  F (37.1  C) (Temporal) 16 1.676 m (5' 6\") 78.9 kg (174 lb)    Pulse Oximetry BMI (Body Mass Index)                99% 28.08 kg/m2          [employer] [facility] Information     [employer] lets you send messages to your doctor, view your test results, renew your prescriptions, schedule appointments and more. To sign up, go to [email or web address] (de[employer] [facility]) . Click on \"Log in\" on the left side of the screen, which will take you to the Welcome page. Then click on \"Sign up Now\" on the right side of the page.     You will be asked to enter the access code listed below, as well as some personal information. Please follow the directions to create your username and password.     Your access code is: [email or web address]  Expires: 2017  1:16 PM     Your access code will  in 90 days. If you need help or a new code, please call your [facility] clinic or [phone].        Care EveryWhere ID     This is your Care EveryWhere ID. This could be used by other organizations to access your [facility] medical records  [id number]           Review of your medicines      START taking        Dose / Directions    cyclobenzaprine 5 MG tablet   Commonly known as:  FLEXERIL        Dose:  5 mg   Take 1 tablet (5 mg) by mouth 3 times daily as needed for muscle spasms   Quantity:  42 tablet   Refills:  3       HYDROcodone-acetaminophen 5-325 MG per tablet   Commonly known as:  NORCO   Used for:  Acute post-operative pain        Dose:  1-2 tablet   Take 1-2 tablets by mouth every 4 hours as needed for moderate to severe pain   Quantity:  60 tablet   Refills:  0         CONTINUE these medicines which have NOT CHANGED        Dose / Directions    AMLODIPINE BESYLATE PO        Dose:  2.5 mg   Take 2.5 mg by mouth daily   Refills:  0       ASPIRIN EC PO        Dose:  81 mg   Take 81 mg by mouth daily   Refills:  0       LOSARTAN POTASSIUM PO        Dose:  100 mg   Take 100 mg by mouth daily   Refills:  0       " magnesium 100 MG Tabs        Dose:  1 tablet   Take 1 tablet by mouth daily   Refills:  0       METOPROLOL TARTRATE PO        Dose:  25 mg   Take 25 mg by mouth 2 times daily   Refills:  0       ROPINIROLE HCL PO        Dose:  1 mg   Take 1 mg by mouth 3 times daily   Refills:  0       TYLENOL PO        Dose:  500 mg   Take 500 mg by mouth every 6 hours as needed for mild pain or fever   Refills:  0       Vitamin D (Cholecalciferol) 1000 UNITS Caps        Dose:  1 tablet   Take 1 tablet by mouth daily   Refills:  0       ZANTAC PO        Dose:  150 mg   Take 150 mg by mouth At Bedtime   Refills:  0            Where to get your medicines      These medications were sent to [facility] [place], [place] - [address]  [address] [facility] [address]     Phone:  [phone]     cyclobenzaprine 5 MG tablet         Some of these will need a paper prescription and others can be bought over the counter. Ask your nurse if you have questions.     Bring a paper prescription for each of these medications     HYDROcodone-acetaminophen 5-325 MG per tablet                Protect others around you: Learn how to safely use, store and throw away your medicines at www.disposemymeds.org.             Medication List: This is a list of all your medications and when to take them. Check marks below indicate your daily home schedule. Keep this list as a reference.      Medications           Morning Afternoon Evening Bedtime As Needed    AMLODIPINE BESYLATE PO   Take 2.5 mg by mouth daily   Last time this was given:  2.5 mg on 4/21/2017  8:37 AM                                   ASPIRIN EC PO   Take 81 mg by mouth daily            Start 4/22                         cyclobenzaprine 5 MG tablet   Commonly known as:  FLEXERIL   Take 1 tablet (5 mg) by mouth 3 times daily as needed for muscle spasms                                   HYDROcodone-acetaminophen 5-325 MG per tablet   Commonly known as:  NORCO    Take 1-2 tablets by mouth every 4 hours as needed for moderate to severe pain   Last time this was given:  1 tablet on 4/21/2017 10:07 AM                                LOSARTAN POTASSIUM PO   Take 100 mg by mouth daily   Last time this was given:  100 mg on 4/21/2017  8:37 AM                                   magnesium 100 MG Tabs   Take 1 tablet by mouth daily                                METOPROLOL TARTRATE PO   Take 25 mg by mouth 2 times daily   Last time this was given:  12.5 mg on 4/21/2017  8:37 AM                       Resume your home dosing               ROPINIROLE HCL PO   Take 1 mg by mouth 3 times daily   Last time this was given:  0.5 mg on 4/21/2017 12:39 PM                                TYLENOL PO   Take 500 mg by mouth every 6 hours as needed for mild pain or fever   Last time this was given:  975 mg on 4/21/2017  1:53 AM                            Do not take while taking norco.        Vitamin D (Cholecalciferol) 1000 UNITS Caps   Take 1 tablet by mouth daily                                   ZANTAC PO   Take 150 mg by mouth At Bedtime   Last time this was given:  150 mg on 4/20/2017  9:03 PM                                             More Information        Discharge Instructions for Spinal Fusion  You have just undergone a procedure known as a spinal fusion. During this procedure, your health care provider locked together (fused) some of the bones in your spine. This limits the movement of these bones to help relieve your pain. Here s what you need to know about home care following a spinal fusion.  Activity    Arrange your household to keep the items you need within reach.    Remove electrical cords, throw rugs, and anything else that may cause you to fall.    Use nonslip bath mats, grab bars, an elevated toilet seat, and a shower chair in your bathroom.    Use a walker or handrails until your balance, flexibility, and strength improve. And remember to ask for help from others when you  need it.    Free up your hands so that you can use them to keep balance. Do this by using a krishna pack, apron, or pockets to carry things. Be sure not to carry too much at once.    Use chairs with arms. The arms make it easier for you to stand up and sit down.    Don t bend or twist at the waist, or raise your hands over your head for the first 2 week(s) after your surgery.    Don t lift anything heavier than 4 pounds for the first 2 week(s) after surgery.    Don t sit for more than 30-45 minutes at a time. Take frequent short walks. They are the key to your recovery.    Nap if you are tired, but don t stay in bed all day.    Don t drive until your doctor says it s okay. And never drive while you are taking opioid pain medication.  Incision care    Check your incision daily or have someone check for you. Look for redness, tenderness, or drainage.    Avoid soaking your wound in water (no hot tubs, bathtubs, swimming pools) until your doctor says it s OK.    Wait 3 day(s) after your surgery to begin showering. Then shower as needed. Carefully wash your incision with soap and water. Gently pat it dry. Don t rub the incision, or apply creams or lotions to it. To avoid falling while showering, use a shower stool.  Other home care    Take your pain medication exactly as directed.    Don t take nonsteroidal, anti-inflammatory medications (NSAIDs), such as ibuprofen or naproxen. They may delay or prevent proper fusion of the spine.    Continue to wear the support stockings you were given in the hospital. Wear them as instructed by your doctor.    Wear your back brace as directed by your doctor.    Keep all appointments for physical therapy. During your hospital stay, you should have been given instructions about physical therapy. If not, ask your doctor.  Follow-up    Make a follow-up appointment as directed by our staff.    Keep appointments for X-rays. They will be taken often to check the status of your spinal  fusion.     When to seek medical attention  Call 911 right away if you have any of the following:    Chest pain    Shortness of breath  Otherwise, call your doctor immediately if you have any of the following:    Increased shoulder pain or pain not relieved by medication    Pain or swelling in the arm on the side of your surgery    Numbness, tingling, or blue-gray color of your arm or fingers on the side of your surgery    Fever above 100.4 F (38.0 C) or shaking chills    Increased swelling or redness around the incision    Drainage or oozing around the incision    Nausea or vomiting     5939-2595 [employer]. [address] [place], [place] [id number]. All rights reserved. This information is not intended as a substitute for professional medical care. Always follow your healthcare professional's instructions.

## 2017-04-21 ENCOUNTER — APPOINTMENT (OUTPATIENT)
Dept: PHYSICAL THERAPY | Facility: CLINIC | Age: 67
DRG: 454 | End: 2017-04-21
Attending: NEUROLOGICAL SURGERY
Payer: COMMERCIAL

## 2017-04-21 ENCOUNTER — APPOINTMENT (OUTPATIENT)
Dept: OCCUPATIONAL THERAPY | Facility: CLINIC | Age: 67
DRG: 454 | End: 2017-04-21
Attending: NEUROLOGICAL SURGERY
Payer: COMMERCIAL

## 2017-04-21 VITALS
SYSTOLIC BLOOD PRESSURE: 146 MMHG | HEART RATE: 53 BPM | OXYGEN SATURATION: 99 % | RESPIRATION RATE: 16 BRPM | WEIGHT: 174 LBS | DIASTOLIC BLOOD PRESSURE: 61 MMHG | TEMPERATURE: 98.7 F | HEIGHT: 66 IN | BODY MASS INDEX: 27.97 KG/M2

## 2017-04-21 LAB
ANION GAP SERPL CALCULATED.3IONS-SCNC: 6 MMOL/L (ref 3–14)
BASOPHILS # BLD AUTO: 0 10E9/L (ref 0–0.2)
BASOPHILS NFR BLD AUTO: 0.1 %
BUN SERPL-MCNC: 8 MG/DL (ref 7–30)
CALCIUM SERPL-MCNC: 8 MG/DL (ref 8.5–10.1)
CHLORIDE SERPL-SCNC: 102 MMOL/L (ref 94–109)
CO2 SERPL-SCNC: 26 MMOL/L (ref 20–32)
CREAT SERPL-MCNC: 0.69 MG/DL (ref 0.66–1.25)
DIFFERENTIAL METHOD BLD: ABNORMAL
EOSINOPHIL # BLD AUTO: 0 10E9/L (ref 0–0.7)
EOSINOPHIL NFR BLD AUTO: 0 %
ERYTHROCYTE [DISTWIDTH] IN BLOOD BY AUTOMATED COUNT: 12.5 % (ref 10–15)
GFR SERPL CREATININE-BSD FRML MDRD: ABNORMAL ML/MIN/1.7M2
GLUCOSE SERPL-MCNC: 138 MG/DL (ref 70–99)
HCT VFR BLD AUTO: 34.4 % (ref 40–53)
HGB BLD-MCNC: 11.7 G/DL (ref 13.3–17.7)
IMM GRANULOCYTES # BLD: 0.1 10E9/L (ref 0–0.4)
IMM GRANULOCYTES NFR BLD: 0.5 %
LYMPHOCYTES # BLD AUTO: 0.5 10E9/L (ref 0.8–5.3)
LYMPHOCYTES NFR BLD AUTO: 4.4 %
MCH RBC QN AUTO: 34.1 PG (ref 26.5–33)
MCHC RBC AUTO-ENTMCNC: 34 G/DL (ref 31.5–36.5)
MCV RBC AUTO: 100 FL (ref 78–100)
MONOCYTES # BLD AUTO: 0.7 10E9/L (ref 0–1.3)
MONOCYTES NFR BLD AUTO: 6.7 %
NEUTROPHILS # BLD AUTO: 9.6 10E9/L (ref 1.6–8.3)
NEUTROPHILS NFR BLD AUTO: 88.3 %
NRBC # BLD AUTO: 0 10*3/UL
NRBC BLD AUTO-RTO: 0 /100
PLATELET # BLD AUTO: 146 10E9/L (ref 150–450)
POTASSIUM SERPL-SCNC: 4.7 MMOL/L (ref 3.4–5.3)
RBC # BLD AUTO: 3.43 10E12/L (ref 4.4–5.9)
SODIUM SERPL-SCNC: 134 MMOL/L (ref 133–144)
WBC # BLD AUTO: 10.8 10E9/L (ref 4–11)

## 2017-04-21 PROCEDURE — 97530 THERAPEUTIC ACTIVITIES: CPT | Mod: GP | Performed by: PHYSICAL THERAPIST

## 2017-04-21 PROCEDURE — 25000125 ZZHC RX 250: Performed by: NEUROLOGICAL SURGERY

## 2017-04-21 PROCEDURE — A9270 NON-COVERED ITEM OR SERVICE: HCPCS | Mod: GY | Performed by: PHYSICIAN ASSISTANT

## 2017-04-21 PROCEDURE — 40000193 ZZH STATISTIC PT WARD VISIT: Performed by: PHYSICAL THERAPIST

## 2017-04-21 PROCEDURE — 25000132 ZZH RX MED GY IP 250 OP 250 PS 637: Mod: GY | Performed by: NURSE PRACTITIONER

## 2017-04-21 PROCEDURE — 85025 COMPLETE CBC W/AUTO DIFF WBC: CPT | Performed by: NEUROLOGICAL SURGERY

## 2017-04-21 PROCEDURE — 99223 1ST HOSP IP/OBS HIGH 75: CPT | Performed by: INTERNAL MEDICINE

## 2017-04-21 PROCEDURE — 99223 1ST HOSP IP/OBS HIGH 75: CPT | Performed by: NURSE PRACTITIONER

## 2017-04-21 PROCEDURE — 36415 COLL VENOUS BLD VENIPUNCTURE: CPT | Performed by: NEUROLOGICAL SURGERY

## 2017-04-21 PROCEDURE — A9270 NON-COVERED ITEM OR SERVICE: HCPCS | Mod: GY | Performed by: NEUROLOGICAL SURGERY

## 2017-04-21 PROCEDURE — 25000132 ZZH RX MED GY IP 250 OP 250 PS 637: Mod: GY | Performed by: NEUROLOGICAL SURGERY

## 2017-04-21 PROCEDURE — 25000132 ZZH RX MED GY IP 250 OP 250 PS 637: Mod: GY | Performed by: PHYSICIAN ASSISTANT

## 2017-04-21 PROCEDURE — A9270 NON-COVERED ITEM OR SERVICE: HCPCS | Mod: GY | Performed by: NURSE PRACTITIONER

## 2017-04-21 PROCEDURE — 40000133 ZZH STATISTIC OT WARD VISIT

## 2017-04-21 PROCEDURE — 97116 GAIT TRAINING THERAPY: CPT | Mod: GP | Performed by: PHYSICAL THERAPIST

## 2017-04-21 PROCEDURE — 80048 BASIC METABOLIC PNL TOTAL CA: CPT | Performed by: NEUROLOGICAL SURGERY

## 2017-04-21 PROCEDURE — 97161 PT EVAL LOW COMPLEX 20 MIN: CPT | Mod: GP | Performed by: PHYSICAL THERAPIST

## 2017-04-21 PROCEDURE — 25000128 H RX IP 250 OP 636: Performed by: NEUROLOGICAL SURGERY

## 2017-04-21 PROCEDURE — A9270 NON-COVERED ITEM OR SERVICE: HCPCS | Mod: GY | Performed by: INTERNAL MEDICINE

## 2017-04-21 PROCEDURE — 25000132 ZZH RX MED GY IP 250 OP 250 PS 637: Mod: GY | Performed by: INTERNAL MEDICINE

## 2017-04-21 PROCEDURE — 97535 SELF CARE MNGMENT TRAINING: CPT | Mod: GO

## 2017-04-21 PROCEDURE — 99207 ZZC CONSULT E&M CHANGED TO INITIAL LEVEL: CPT | Performed by: INTERNAL MEDICINE

## 2017-04-21 PROCEDURE — 97165 OT EVAL LOW COMPLEX 30 MIN: CPT | Mod: GO

## 2017-04-21 RX ORDER — HYDROCODONE BITARTRATE AND ACETAMINOPHEN 5; 325 MG/1; MG/1
1 TABLET ORAL EVERY 4 HOURS PRN
Status: DISCONTINUED | OUTPATIENT
Start: 2017-04-21 | End: 2017-04-21

## 2017-04-21 RX ORDER — HYDROCODONE BITARTRATE AND ACETAMINOPHEN 5; 325 MG/1; MG/1
1 TABLET ORAL EVERY 6 HOURS PRN
Status: DISCONTINUED | OUTPATIENT
Start: 2017-04-21 | End: 2017-04-21

## 2017-04-21 RX ORDER — HYDROCODONE BITARTRATE AND ACETAMINOPHEN 5; 325 MG/1; MG/1
1-2 TABLET ORAL EVERY 4 HOURS PRN
Qty: 60 TABLET | Refills: 0 | Status: SHIPPED | OUTPATIENT
Start: 2017-04-21

## 2017-04-21 RX ORDER — HYDROCODONE BITARTRATE AND ACETAMINOPHEN 5; 325 MG/1; MG/1
1 TABLET ORAL EVERY 4 HOURS PRN
Qty: 60 TABLET | Refills: 0
Start: 2017-04-21 | End: 2017-04-21

## 2017-04-21 RX ORDER — HYDROCODONE BITARTRATE AND ACETAMINOPHEN 5; 325 MG/1; MG/1
1-2 TABLET ORAL EVERY 4 HOURS PRN
Status: DISCONTINUED | OUTPATIENT
Start: 2017-04-21 | End: 2017-04-21 | Stop reason: HOSPADM

## 2017-04-21 RX ORDER — ACETAMINOPHEN 325 MG/1
325 TABLET ORAL EVERY 4 HOURS PRN
Status: DISCONTINUED | OUTPATIENT
Start: 2017-04-21 | End: 2017-04-21

## 2017-04-21 RX ORDER — CALCIUM CARBONATE 500 MG/1
500 TABLET, CHEWABLE ORAL DAILY PRN
Status: DISCONTINUED | OUTPATIENT
Start: 2017-04-21 | End: 2017-04-21 | Stop reason: HOSPADM

## 2017-04-21 RX ORDER — HYDROCODONE BITARTRATE AND ACETAMINOPHEN 5; 325 MG/1; MG/1
1 TABLET ORAL EVERY 6 HOURS PRN
Qty: 60 TABLET | Refills: 0 | Status: SHIPPED | OUTPATIENT
Start: 2017-04-21 | End: 2017-04-21

## 2017-04-21 RX ORDER — ACETAMINOPHEN 325 MG/1
325 TABLET ORAL EVERY 4 HOURS PRN
Status: DISCONTINUED | OUTPATIENT
Start: 2017-04-21 | End: 2017-04-21 | Stop reason: HOSPADM

## 2017-04-21 RX ADMIN — POTASSIUM CHLORIDE AND SODIUM CHLORIDE: 450; 150 INJECTION, SOLUTION INTRAVENOUS at 06:03

## 2017-04-21 RX ADMIN — MAGNESIUM 64 MG (MAGNESIUM CHLORIDE) TABLET,DELAYED RELEASE 535 MG: at 08:38

## 2017-04-21 RX ADMIN — CALCIUM CARBONATE (ANTACID) CHEW TAB 500 MG 500 MG: 500 CHEW TAB at 06:02

## 2017-04-21 RX ADMIN — METOPROLOL TARTRATE 12.5 MG: 25 TABLET, FILM COATED ORAL at 08:37

## 2017-04-21 RX ADMIN — ROPINIROLE HYDROCHLORIDE 0.5 MG: 1 TABLET, FILM COATED ORAL at 12:39

## 2017-04-21 RX ADMIN — KETOROLAC TROMETHAMINE 15 MG: 15 INJECTION, SOLUTION INTRAMUSCULAR; INTRAVENOUS at 00:01

## 2017-04-21 RX ADMIN — LOSARTAN POTASSIUM 100 MG: 100 TABLET, FILM COATED ORAL at 08:37

## 2017-04-21 RX ADMIN — KETOROLAC TROMETHAMINE 15 MG: 15 INJECTION, SOLUTION INTRAMUSCULAR; INTRAVENOUS at 06:02

## 2017-04-21 RX ADMIN — ACETAMINOPHEN 975 MG: 325 TABLET, FILM COATED ORAL at 01:53

## 2017-04-21 RX ADMIN — CEFAZOLIN SODIUM 1 G: 1 INJECTION, SOLUTION INTRAVENOUS at 04:29

## 2017-04-21 RX ADMIN — HYDROCODONE BITARTRATE AND ACETAMINOPHEN 1 TABLET: 5; 325 TABLET ORAL at 10:07

## 2017-04-21 RX ADMIN — KETOROLAC TROMETHAMINE 15 MG: 15 INJECTION, SOLUTION INTRAMUSCULAR; INTRAVENOUS at 12:40

## 2017-04-21 RX ADMIN — ACETAMINOPHEN 325 MG: 325 TABLET, FILM COATED ORAL at 15:53

## 2017-04-21 RX ADMIN — HYDROCODONE BITARTRATE AND ACETAMINOPHEN 1 TABLET: 5; 325 TABLET ORAL at 15:53

## 2017-04-21 RX ADMIN — HYDROMORPHONE HYDROCHLORIDE 0.5 MG: 1 INJECTION, SOLUTION INTRAMUSCULAR; INTRAVENOUS; SUBCUTANEOUS at 05:02

## 2017-04-21 RX ADMIN — AMLODIPINE BESYLATE 2.5 MG: 2.5 TABLET ORAL at 08:37

## 2017-04-21 NOTE — PROGRESS NOTES
04/21/17 1011   Quick Adds   Type of Visit Initial PT Evaluation   Living Environment   Lives With spouse   Living Arrangements house   Number of Stairs to Enter Home 2   Number of Stairs Within Home 8  (4+4, rail on R, spindles on L that are stable to assist)   Transportation Available car;family or friend will provide   Living Environment Comment Lives in split level with wife who will be around at discharge to assist as needed; 4 steps to full bed/bathroom   Self-Care   Usual Activity Tolerance moderate   Current Activity Tolerance moderate   Equipment Currently Used at Home none  (has FWW, shower chair)   Functional Level Prior   Ambulation 0-->independent   Transferring 0-->independent   Toileting 0-->independent   Bathing 0-->independent   Dressing 0-->independent   Eating 0-->independent   Communication 0-->understands/communicates without difficulty   Swallowing 0-->swallows foods/liquids without difficulty   Cognition 0 - no cognition issues reported   Fall history within last six months no   Which of the above functional risks had a recent onset or change? ambulation;transferring   Prior Functional Level Comment IND with functional mobility and ADLs   General Information   Onset of Illness/Injury or Date of Surgery - Date 04/20/17   Referring Physician Murray Barrios MD   Patient/Family Goals Statement to go home   Pertinent History of Current Problem (include personal factors and/or comorbidities that impact the POC) Pt is a 66 year old male who is POD #1 s/p L4-S1 discectomy and fusion   Precautions/Limitations spinal precautions   General Info Comments Activity: Up with assist    Cognitive Status Examination   Orientation orientation to person, place and time   Level of Consciousness alert   Follows Commands and Answers Questions 75% of the time   Personal Safety and Judgment at risk behaviors demonstrated   Memory intact   Pain Assessment   Patient Currently in Pain (1/10 at rest in back)  "  Integumentary/Edema   Integumentary/Edema Comments Bandage appears C/D/I, hemovac patent   Posture    Posture Forward head position;Protracted shoulders;Kyphosis;Scoliosis   Range of Motion (ROM)   ROM Comment WFL in BLE   Strength   Strength Comments Appeared WFL as observed through functional mobility   Bed Mobility   Bed Mobility Comments SBA supine > sit with HOB slightly elevated   Transfer Skills   Transfer Comments SBA with FWW   Gait   Gait Comments CGA with FWW x80' stepage gait noted on LLE, pt reports he feels his LLE is shorter than RLE after surgery   Balance   Balance Comments Good, no overt LOB/lateral path deviation noted with static/dynamic standing activities   Sensory Examination   Sensory Perception Comments c/o baseline tingling in B feet   General Therapy Interventions   Planned Therapy Interventions balance training;bed mobility training;gait training;ROM;strengthening;stretching;transfer training;risk factor education;home program guidelines;progressive activity/exercise;neuromuscular re-education   Clinical Impression   Criteria for Skilled Therapeutic Intervention yes, treatment indicated   PT Diagnosis impaired gait   Influenced by the following impairments pain, post-op precautions   Functional limitations due to impairments bed mobility, transfers, ambulation, stair climbing   Clinical Presentation Stable/Uncomplicated   Clinical Presentation Rationale stable clinical picture this date   Clinical Decision Making (Complexity) Low complexity   Therapy Frequency` 2 times/day   Predicted Duration of Therapy Intervention (days/wks) 2 days   Anticipated Equipment Needs at Discharge (has FWW already)   Anticipated Discharge Disposition Home with Assist   Risk & Benefits of therapy have been explained Yes   Patient, Family & other staff in agreement with plan of care Yes   [facility] AM-PAC TM \"6 Clicks\"   2016, Trustees of [facility], under license to [employer].  All rights " "reserved.   6 Clicks Short Forms Basic Mobility Inpatient Short Form   [facility] AM-PAC  \"6 Clicks\" V.2 Basic Mobility Inpatient Short Form   1. Turning from your back to your side while in a flat bed without using bedrails? 3 - A Little   2. Moving from lying on your back to sitting on the side of a flat bed without using bedrails? 3 - A Little   3. Moving to and from a bed to a chair (including a wheelchair)? 3 - A Little   4. Standing up from a chair using your arms (e.g., wheelchair, or bedside chair)? 3 - A Little   5. To walk in hospital room? 3 - A Little   6. Climbing 3-5 steps with a railing? 2 - A Lot   Basic Mobility Raw Score (Score out of 24.Lower scores equate to lower levels of function) 17   Total Evaluation Time   Total Evaluation Time (Minutes) 11     "

## 2017-04-21 NOTE — PLAN OF CARE
Pt cam up from surgery with BP 95/58 HR 37.  Hr still runs 40-53 and BP now 135/55.  Pt is a smoker. Lungs audible ex wheeze and coarse lung sounds. Fidgety. Family is here.  Is asking for food every 5 min.  Clear liq given. Pt twisting and turning in bed.  Explained body mechanics to pt that he needs to move back like it is a log.  Pt is a little better since he ate. Cont to follow POC.

## 2017-04-21 NOTE — PROGRESS NOTES
"BP 99/44  Pulse (!) 44  Temp 94.5  F (34.7  C) (Oral)  Resp 12  Ht 1.676 m (5' 6\")  Wt 78.9 kg (174 lb)  SpO2 99%  BMI 28.08 kg/m2   Admitted- 4/20 L4/5/S1  degenerative disc disease  Hx-DDD, HNP, Stenosis   Code- Full  A/O x4   LS- diminished but clear on 2L NC pt is a smoker LS were wheeze tobacco patch on L arm  Heart tones- lizabeth and bp is soft. Took all of his BP meds before surg. Tele placed Sinus lizabeth with frequent PAC's with a 1 degree AVB with RBBB and ST depression with prolonged QT  CMS-intact L leg is weaker than the R. Numbness in tingling in the feet and hands  GI- BS-active flatus- no nausea-none bm 4/20 before surgery  - dasilva in place  Tolerating clears Diet  IV- 1/2 20 @ 100 ancef- protolithic order  Dressing- outlined on the L side and at the bottom  Pain Management- holding off on px meds at this time due to HR in the 40's pt is comfortable. Has flexeril, dilaudid, tylenol and oxycodone available when needed  Anticoagulation- none scheduled in MAR  Activity- needs to dangle have not done this due to the BP and HR at the current time  Immobilizers or Braces- none  Labs-EBL 50 will need xray tomorrow  Services-pain team consult  DC plan- pending    "

## 2017-04-21 NOTE — PROGRESS NOTES
Discharge instructions given to pt and wife.  Verbalize understanding.   Prescriptions for Norco and Flexeril given to pt. Additional dressing change materials given if needed.  Personal belongings sent with pt.  Will schedule follow up with PCP in 2 weeks for wound check and follow up with surgeon in a month.  Encouraged to call if any concerns or problems.  Escorted via w/c accompanied by LPN to exit.  Leaves for home with wife.

## 2017-04-21 NOTE — PROGRESS NOTES
DAILY PROGRESS NOTE    Cody Mac is a 66 year old old male admitted on 4/20/2017  9:17 AM.    Subjective       Objective    AAOx3, ECHAVARRIA , f/c 4/4   Ambulating,     Hemoglobin   Date Value Ref Range Status   04/21/2017 11.7 (L) 13.3 - 17.7 g/dL Final   ]  Drain 5 cc over the night     Impression / Plan       Plan for today:    Patient doing well  Ambulate with help  PT OT, possibly clearance   D/c brown this am  Full diet  Today  Wean and d/c PCA and transition to PO meds today drain 5 cc over the night remove it now     D/c tomorrow but if cleared by pt OLK to go home tonight as well

## 2017-04-21 NOTE — CONSULTS
Hospitalist Consultation      Cody Mac MRN# [id number]   YOB: 1950 Age: 66 year old   Date of Admission: 4/20/2017     Requesting Physician:  Dr. Barrios  Reason for consult: Post-op medical management           Assessment and Plan:   This patient is a 66 year old male who is POD #1 s/p L4-S1 discectomy and fusion.  Overall doing well, no complaints. Pain controlled.    1. L4-S1 discectomy and fusion - pain, prophylaxis, diet and PT/OT per spine.  2. Bradycardia - HRs into the 30s in PACU, was given atropine. HRs 40-50s on the floor overnight. Asymptomatic per patient. HRs 50s this AM. Hx of paroxysmal SVT, on metoprolol. Will reduce metoprolol this morning to 12.5 mg BID. If well tolerated and HRs appropriate, may consider increasing back to 25 mg this evening. If he has recurrent SVT, may give PRN dose of 12.5 mg metoprolol and resume regular dosing at 25 mg BID. Continue tele.   3. Paroxysmal SVT - on metoprolol 25 mg BID. Plan per above.   4. HTN - BPs stable. Resume home meds with parameters.   5. PVD - hx of iliac artery stent in 2016. Per cardiology, resume aspirin as soon as ok with surgeon. Will defer resumption of ASA to spine surgery.   6. Current tobacco use - just under 1 pack per day, nicotine patch in place, continue.  7. GERD - resume home meds  8. RLS - resume home meds  9. HLP - not on statin at this time. Per cardiology note, plan to discuss initiation of statin once recovered from spine surgery  10. Chronic pain - low back pain, hx of cervical decompression surgery             History of Present Illness:   This patient is a 66 year old male who is POD #1 s/p L4-S1 discectomy and fusion. He has severe DDD and scoliosis of the lumbar spine and has failed conservative outpatient management so presents here for elective L4-S1 discectomy and fusion. He tolerated the surgery well, pain is well controlled, has had adequate I&Os, and he is tolerating PO intake. In PACU, he did have  bradycardia with HRs down intot he 30s. Pt states he was asymptomatic. He was given atropine in PACU and HRs remained 40-50s on the floor overnight. Holguin remains in place, he is passing gas but no BM yet. He denies fever, chills, chest pain, SOB, cough, abdominal pain, nausea, vomiting, or diarrhea. He also has a PMHx of HTN, paroxysmal SVT, PVD, GERD, RLS, HLP, chronic pain and current tobacco use.              Past Medical History:     Past Medical History:   Diagnosis Date     Arrhythmia      Hypertension     Cardilogist Bobby Razo     Other chronic pain     Back pain for over 5 years.   paroxysmal SVT  PVD - iliac stent in 2016  GERD  RLS  HLP          Past Surgical History:     Past Surgical History:   Procedure Laterality Date     C LAT LUMBAR SPINE FUSION       FUSION CERVICAL ANTERIOR ONE LEVEL                   Social History:     Social History   Substance Use Topics     Smoking status: Current Every Day Smoker     Packs/day: 1.00     Years: 40.00     Types: Cigarettes     Smokeless tobacco: Not on file      Comment: Has cut back on smoking recently.     Alcohol use Yes      Comment: 1 beer weekly or less               Family History:   I have reviewed this patient's family history  Son - DM          Allergies:   No Known Allergies          Medications:     Prior to Admission medications    Medication Sig Last Dose Taking? Auth Provider   HYDROcodone-acetaminophen (NORCO) 5-325 MG per tablet Take 1 tablet by mouth every 6 hours as needed for moderate to severe pain  Yes Murray Barrios MD   oxyCODONE (ROXICODONE) 5 MG IR tablet Take 1-2 tablets (5-10 mg) by mouth every 4 hours as needed for moderate to severe pain  Yes Murray Barrios MD   cyclobenzaprine (FLEXERIL) 5 MG tablet Take 1 tablet (5 mg) by mouth 3 times daily as needed for muscle spasms  Yes Murray Barrios MD   AMLODIPINE BESYLATE PO Take 2.5 mg by mouth daily 4/20/2017 at 0700 Yes Reported, Patient   LOSARTAN POTASSIUM PO Take 100 mg by mouth  "daily 4/20/2017 at 0700 Yes Reported, Patient   METOPROLOL TARTRATE PO Take 25 mg by mouth 2 times daily 4/20/2017 at 0700 Yes Reported, Patient   Acetaminophen (TYLENOL PO) Take 500 mg by mouth every 6 hours as needed for mild pain or fever 4/19/2017 at 2200 Yes Reported, Patient   ASPIRIN EC PO Take 81 mg by mouth daily 0ver a week ago Yes Reported, Patient   Vitamin D, Cholecalciferol, 1000 UNITS CAPS Take 1 tablet by mouth daily over a week ago' Yes Reported, Patient   magnesium 100 MG TABS Take 1 tablet by mouth daily over a week ago Yes Reported, Patient   ROPINIROLE HCL PO Take 1 mg by mouth 3 times daily 4/19/2017 at 2200 Yes Reported, Patient   RaNITidine HCl (ZANTAC PO) Take 150 mg by mouth At Bedtime Unknown at Unknown time  Reported, Patient               Review of Systems:   A comprehensive greater than 10 system review of systems was carried out.  Pertinent positives and negatives are noted above.  Otherwise negative for contributory info.            Physical Exam:   Vitals were reviewed  Blood pressure 133/62, pulse 51, temperature 97.5  F (36.4  C), temperature source Oral, resp. rate 16, height 1.676 m (5' 6\"), weight 78.9 kg (174 lb), SpO2 99 %.  Exam:    GENERAL:  Comfortable.  PSYCH: pleasant, oriented, No acute distress.  HEENT:  Normal conjunctiva, normal hearing, nasal mucosa and Oropharynx are normal.  NECK:  Supple, no neck vein distention  HEART:  Normal S1, S2 with no murmur, no pericardial rub, S3 or S4.  LUNGS:  Clear to auscultation, normal Respiratory effort.  GI:  Soft, normal bowel sounds.  EXTREMITIES:  No pedal edema, +2 pulses bilateral and equal.  SKIN:  Dry to touch, No rash, wound or ulcerations.  NEUROLOGIC:  Nonfocal with normal cranial nerve and motor power and sensation.            Data:   Past 24 hours labs, studies, and imaging were reviewed.    Recent Labs  Lab 04/21/17  0650   WBC 10.8   HGB 11.7*   HCT 34.4*      *       Recent Labs  Lab 04/21/17  0650 "      POTASSIUM 4.7   CHLORIDE 102   CO2 26   ANIONGAP 6   *   BUN 8   CR 0.69   GFRESTIMATED >90Non  GFR Calc   GFRESTBLACK >90African American GFR Calc   GEOVANNA 8.0*       Mee Gutierrez PA-C    Pt discussed with Dr. Miranda who agrees with the care as discussed above.

## 2017-04-21 NOTE — PLAN OF CARE
Problem: Goal Outcome Summary  Goal: Goal Outcome Summary  PT: Orders received, eval completed, treatment initiated.  Pt s/p L4-S1 fusion, POD #1.  Lives in split level with wife who will be around at discharge to assist as needed.  Pt has had experience with previous back surgeries.  Was IND with functional mobility and ADLs at baseline.  Currently, pt needing SBA for supine > sit with cues for log roll technique; CGA sit <> Stand with FWW, amb x80' with FWW and CGA, demos steppage gait pattern with LLE and reports sensation that LLE is shorter than R after surgery.  Rates pain 1/10 at rest, 8/10 after activity; O2 sats dropped to 88% after activity but quickly flo to 96% upon seated rest break.     Recommend home with assist from spouse pending safety on stairs.

## 2017-04-21 NOTE — CONSULTS
[facility]  Pain Service Consultation   Text Page    Date of Admission:  4/20/2017    Assessment & Plan   Cody Mac is a 66 year old male who was admitted on 4/20/2017. I was asked to see the patient for pain management.    1) Acute pain POD #1 L4-S1 discectomy and fusion.    2)  Patient with chronic B hip pain,NOT on chronic opioid therapy  [place]  database review: 1 tab valium 5mg in past year  Patient has noexpected opioid tolerance.     Patient's opioid use thus far: 0.5mg IV dilaudid overnight.  1 Vicodin today    3)  Opioid induced side-effects:  -Constipation - denies  -Nausea/Vomit-denies  -Sedation-denies      4) Other/Related:    -Deconditioning  -Restless leg syndrome- only partially helped with Requip    PLAN:   1)Patient is doing well on a surprisingly low dose of opioid, likely due to concurrent Toradol  add 325mg tylenol to each Vicodin.  2) Suggested trial of Neurontin for RLS.  Gave patient information and he will discuss with his primary MD.  3)Multimodal Medication Therapy  Topical:no  NSAIDS:Toradol   Muscle Relaxants:none  Adjuvants:Tylenol  Antidepresants/anxiolytics:no  Opioids:Vicodin 1-2 tabs q 4 hours, Using only 1 today but may need 2 once unable to use Toradol.  4)Non-medication interventions  Positioning, rest, ice  5)Constipation Prophylaxis  Monitor BM,   6) DC safety  -Opioid Safety information included in After Visit Summary (AVS)  -Recommend short suppy of Opioids only at discharge (3 days)    Time Spent on this Encounter   I spent  35 minutes in assessment of the patient and discussion with the patient and family.  Another 35 minutes in review of chart, documentation and discussion with the health care team.    Susie Spence APRN, CNS  Pain Management and Palliative Care  [facility]  Pgr: [phone]      Reason for Consult   Reason for consult: I was asked by Dr. Barrios to evaluate this patient for pain management.    Primary Care Physician  "  Primary Care Physician:Yoli Alatorre  Pain Specialist: None    Chief Complaint   B hip pain prior to surgery, now only \"surgical pain\"  In mid back    History is obtained from the patient, electronic health record and patient's spouse    History of Present Illness   Cody Mac is a 66 year old male who presents POD #1 L4-S1 discectomy and fusion.    CURRENT PAIN:  His pain is located in the mid low back  It is described as \"surgical\"  He rates it as ranging between 0/10 and 6/10  The average is 4/10 on a scale of 0-10  Currently it is rated as 0/10  It improves by medication and positioning  It worsens by movement, therapy  He has been compliant with the recommendations while in the hospital.      PAIN HISTORY:  The pain is mainly located in the B hips  It is described as Sharp  Rates it as ranging between 0/10 and 20/10  Currently it is rated as 0/10  It improves by sitting  It worsens by walking more than 100ft.  He has been compliant with the recommendations while in the hospital.    PAST PAIN TREATMENT:   Medications:tylenol, requip, vit D  Non-phamacologic modalities:stent for PAD, injections  Previous interventions/surgeries:previous lumbar and cervical fusions        Past Medical History   I have reviewed this patient's medical history and updated it with pertinent information if needed.   Past Medical History:   Diagnosis Date     Arrhythmia      Hypertension     Cardilogist Bobby Razo     Other chronic pain     Back pain for over 5 years.       Past Surgical History   I have reviewed this patient's surgical history and updated it with pertinent information if needed.  Past Surgical History:   Procedure Laterality Date     C LAT LUMBAR SPINE FUSION       FUSION CERVICAL ANTERIOR ONE LEVEL       FUSION SPINE POSTERIOR MINIMALLY INVASIVE TWO LEVELS N/A 4/20/2017    Procedure: FUSION SPINE POSTERIOR MINIMALLY INVASIVE TWO LEVELS;  L4-S1 Oblique Lateral Lumbar Interbody Fusion  Hardware removal of previous " L3-L4 fusion  Epidural Steroid injection;  Surgeon: Murray Barrios MD;  Location: [facility] OR     INJECT STEROID (LOCATION) N/A 4/20/2017    Procedure: INJECT STEROID (LOCATION);;  Surgeon: Murray Barrios MD;  Location: [facility] OR         Prior to Admission Medications   Prior to Admission Medications   Prescriptions Last Dose Informant Patient Reported? Taking?   AMLODIPINE BESYLATE PO 4/20/2017 at 0700  Yes Yes   Sig: Take 2.5 mg by mouth daily   ASPIRIN EC PO 0ver a week ago  Yes Yes   Sig: Take 81 mg by mouth daily   Acetaminophen (TYLENOL PO) 4/19/2017 at 2200  Yes Yes   Sig: Take 500 mg by mouth every 6 hours as needed for mild pain or fever   LOSARTAN POTASSIUM PO 4/20/2017 at 0700  Yes Yes   Sig: Take 100 mg by mouth daily   METOPROLOL TARTRATE PO 4/20/2017 at 0700  Yes Yes   Sig: Take 25 mg by mouth 2 times daily   ROPINIROLE HCL PO 4/19/2017 at 2200  Yes Yes   Sig: Take 1 mg by mouth 3 times daily   RaNITidine HCl (ZANTAC PO) Unknown at Unknown time  Yes No   Sig: Take 150 mg by mouth At Bedtime   Vitamin D, Cholecalciferol, 1000 UNITS CAPS over a week ago'  Yes Yes   Sig: Take 1 tablet by mouth daily   magnesium 100 MG TABS over a week ago  Yes Yes   Sig: Take 1 tablet by mouth daily      Facility-Administered Medications: None     Allergies   No Known Allergies    Social History   I have reviewed this patient's social history and updated it with pertinent information if needed. Cody Mac  reports that he has been smoking Cigarettes.  He has a 40.00 pack-year smoking history. He does not have any smokeless tobacco history on file. He reports that he drinks alcohol. He reports that he does not use illicit drugs.    Family History   I have reviewed this patient's family history and updated it with pertinent information if needed.   History reviewed. No pertinent family history.     Review of Systems   The 10 point Review of Systems is negative other than noted in the HPI or here.     Physical Exam   Temp:  [94.5   F (34.7  C)-98.7  F (37.1  C)] 98.7  F (37.1  C)  Pulse:  [44-53] 53  Heart Rate:  [36-69] 52  Resp:  [11-21] 16  BP: ()/(34-72) 146/61  SpO2:  [92 %-100 %] 99 %  174 lbs 0 oz  GEN:  Alert, oriented x 3, appears comfortable, NAD.  HEENT:  Normocephalic/atraumatic, no scleral icterus, no nasal discharge, mouth moist.  CV:  RRR, S1, S2; no murmurs or other irregularities noted.  +3 DP/PT pulses bilatererally; no edema BLE.  RESP:  Clear to auscultation bilaterally without rales/rhonchi/wheezing/retractions.  Symmetric chest rise on inhalation noted.  Normal respiratory effort.  ABD:  Rounded, soft, non-tender/non-distended.  +BS  EXT:  Edema & pulses as noted above.  CMS intact x 4.     M/S:   Back dressing CD+I  SKIN:  Dry to touch, no exanthems noted in the visualized areas.    NEURO: Symmetric strength +5/5.  Sensation to touch intact all extremities.   There is no area of allodynia or hyperesthesia.  PAIN BEHAVIOR: Cooperative  Psych:  Normal affect.  Calm, cooperative, conversant appropriately.       Data   Results for orders placed or performed during the hospital encounter of 04/20/17 (from the past 24 hour(s))   Hospitalist IP Consult: Patient to be seen: Routine - within 24 hours; Hospitalist Consult; Consultant may enter orders: Yes    Narrative    Mee Gutierrez PA-C     4/21/2017  9:01 AM  Hospitalist Consultation      Cody Mac MRN# [id number]   YOB: 1950 Age: 66 year old   Date of Admission: 4/20/2017     Requesting Physician:  Dr. Barrios  Reason for consult: Post-op medical management           Assessment and Plan:   This patient is a 66 year old male who is POD #1 s/p L4-S1   discectomy and fusion.  Overall doing well, no complaints. Pain controlled.    1. L4-S1 discectomy and fusion - pain, prophylaxis, diet and   PT/OT per spine.  2. Bradycardia - HRs into the 30s in PACU, was given atropine.   HRs 40-50s on the floor overnight. Asymptomatic per patient. HRs   50s  this AM. Hx of paroxysmal SVT, on metoprolol. Will reduce   metoprolol this morning to 12.5 mg BID. If well tolerated and HRs   appropriate, may consider increasing back to 25 mg this evening.   If he has recurrent SVT, may give PRN dose of 12.5 mg metoprolol   and resume regular dosing at 25 mg BID. Continue tele.   3. Paroxysmal SVT - on metoprolol 25 mg BID. Plan per above.   4. HTN - BPs stable. Resume home meds with parameters.   5. PVD - hx of iliac artery stent in 2016. Per cardiology, resume   aspirin as soon as ok with surgeon. Will defer resumption of ASA   to spine surgery.   6. Current tobacco use - just under 1 pack per day, nicotine   patch in place, continue.  7. GERD - resume home meds  8. RLS - resume home meds  9. HLP - not on statin at this time. Per cardiology note, plan to   discuss initiation of statin once recovered from spine surgery  10. Chronic pain - low back pain, hx of cervical decompression   surgery             History of Present Illness:   This patient is a 66 year old male who is POD #1 s/p L4-S1   discectomy and fusion. He has severe DDD and scoliosis of the   lumbar spine and has failed conservative outpatient management so   presents here for elective L4-S1 discectomy and fusion. He   tolerated the surgery well, pain is well controlled, has had   adequate I&Os, and he is tolerating PO intake. In PACU, he did   have bradycardia with HRs down intot he 30s. Pt states he was   asymptomatic. He was given atropine in PACU and HRs remained   40-50s on the floor overnight. Holguin remains in place, he is   passing gas but no BM yet. He denies fever, chills, chest pain,   SOB, cough, abdominal pain, nausea, vomiting, or diarrhea. He   also has a PMHx of HTN, paroxysmal SVT, PVD, GERD, RLS, HLP,   chronic pain and current tobacco use.              Past Medical History:     Past Medical History:   Diagnosis Date     Arrhythmia      Hypertension     Cardilogist Bobby Razo     Other chronic  pain     Back pain for over 5 years.   paroxysmal SVT  PVD - iliac stent in 2016  GERD  RLS  HLP          Past Surgical History:     Past Surgical History:   Procedure Laterality Date     C LAT LUMBAR SPINE FUSION       FUSION CERVICAL ANTERIOR ONE LEVEL                   Social History:     Social History   Substance Use Topics     Smoking status: Current Every Day Smoker     Packs/day: 1.00     Years: 40.00     Types: Cigarettes     Smokeless tobacco: Not on file      Comment: Has cut back on smoking recently.     Alcohol use Yes      Comment: 1 beer weekly or less               Family History:   I have reviewed this patient's family history  Son - DM          Allergies:   No Known Allergies          Medications:     Prior to Admission medications    Medication Sig Last Dose Taking? Auth Provider   HYDROcodone-acetaminophen (NORCO) 5-325 MG per tablet Take 1   tablet by mouth every 6 hours as needed for moderate to severe   pain  Yes Murray Barrios MD   oxyCODONE (ROXICODONE) 5 MG IR tablet Take 1-2 tablets (5-10 mg)   by mouth every 4 hours as needed for moderate to severe pain  Yes   Murray Barrios MD   cyclobenzaprine (FLEXERIL) 5 MG tablet Take 1 tablet (5 mg) by   mouth 3 times daily as needed for muscle spasms  Yes Murray Barrios MD   AMLODIPINE BESYLATE PO Take 2.5 mg by mouth daily 4/20/2017 at   0700 Yes Reported, Patient   LOSARTAN POTASSIUM PO Take 100 mg by mouth daily 4/20/2017 at   0700 Yes Reported, Patient   METOPROLOL TARTRATE PO Take 25 mg by mouth 2 times daily   4/20/2017 at 0700 Yes Reported, Patient   Acetaminophen (TYLENOL PO) Take 500 mg by mouth every 6 hours as   needed for mild pain or fever 4/19/2017 at 2200 Yes Reported,   Patient   ASPIRIN EC PO Take 81 mg by mouth daily 0ver a week ago Yes   Reported, Patient   Vitamin D, Cholecalciferol, 1000 UNITS CAPS Take 1 tablet by   mouth daily over a week ago' Yes Reported, Patient   magnesium 100 MG TABS Take 1 tablet by mouth daily over a  "week   ago Yes Reported, Patient   ROPINIROLE HCL PO Take 1 mg by mouth 3 times daily 4/19/2017 at   2200 Yes Reported, Patient   RaNITidine HCl (ZANTAC PO) Take 150 mg by mouth At Bedtime   Unknown at Unknown time  Reported, Patient               Review of Systems:   A comprehensive greater than 10 system review of systems was   carried out.  Pertinent positives and negatives are noted above.    Otherwise negative for contributory info.            Physical Exam:   Vitals were reviewed  Blood pressure 133/62, pulse 51, temperature 97.5  F (36.4  C),   temperature source Oral, resp. rate 16, height 1.676 m (5' 6\"),   weight 78.9 kg (174 lb), SpO2 99 %.  Exam:    GENERAL:  Comfortable.  PSYCH: pleasant, oriented, No acute distress.  HEENT:  Normal conjunctiva, normal hearing, nasal mucosa and   Oropharynx are normal.  NECK:  Supple, no neck vein distention  HEART:  Normal S1, S2 with no murmur, no pericardial rub, S3 or   S4.  LUNGS:  Clear to auscultation, normal Respiratory effort.  GI:  Soft, normal bowel sounds.  EXTREMITIES:  No pedal edema, +2 pulses bilateral and equal.  SKIN:  Dry to touch, No rash, wound or ulcerations.  NEUROLOGIC:  Nonfocal with normal cranial nerve and motor power   and sensation.            Data:   Past 24 hours labs, studies, and imaging were reviewed.    Recent Labs  Lab 04/21/17  0650   WBC 10.8   HGB 11.7*   HCT 34.4*      *       Recent Labs  Lab 04/21/17  0650      POTASSIUM 4.7   CHLORIDE 102   CO2 26   ANIONGAP 6   *   BUN 8   CR 0.69   GFRESTIMATED >90Non  GFR Calc   GFRESTBLACK >90African American GFR Calc   GEOVANNA 8.0*       Mee Gutierrez PA-C    Pt discussed with Dr. Miranda who agrees with the care as   discussed above.    Basic metabolic panel   Result Value Ref Range    Sodium 134 133 - 144 mmol/L    Potassium 4.7 3.4 - 5.3 mmol/L    Chloride 102 94 - 109 mmol/L    Carbon Dioxide 26 20 - 32 mmol/L    Anion Gap 6 3 - 14 mmol/L    " Glucose 138 (H) 70 - 99 mg/dL    Urea Nitrogen 8 7 - 30 mg/dL    Creatinine 0.69 0.66 - 1.25 mg/dL    GFR Estimate >90  Non  GFR Calc   >60 mL/min/1.7m2    GFR Estimate If Black >90   GFR Calc   >60 mL/min/1.7m2    Calcium 8.0 (L) 8.5 - 10.1 mg/dL   CBC with platelets differential   Result Value Ref Range    WBC 10.8 4.0 - 11.0 10e9/L    RBC Count 3.43 (L) 4.4 - 5.9 10e12/L    Hemoglobin 11.7 (L) 13.3 - 17.7 g/dL    Hematocrit 34.4 (L) 40.0 - 53.0 %     78 - 100 fl    MCH 34.1 (H) 26.5 - 33.0 pg    MCHC 34.0 31.5 - 36.5 g/dL    RDW 12.5 10.0 - 15.0 %    Platelet Count 146 (L) 150 - 450 10e9/L    Diff Method Automated Method     % Neutrophils 88.3 %    % Lymphocytes 4.4 %    % Monocytes 6.7 %    % Eosinophils 0.0 %    % Basophils 0.1 %    % Immature Granulocytes 0.5 %    Nucleated RBCs 0 0 /100    Absolute Neutrophil 9.6 (H) 1.6 - 8.3 10e9/L    Absolute Lymphocytes 0.5 (L) 0.8 - 5.3 10e9/L    Absolute Monocytes 0.7 0.0 - 1.3 10e9/L    Absolute Eosinophils 0.0 0.0 - 0.7 10e9/L    Absolute Basophils 0.0 0.0 - 0.2 10e9/L    Abs Immature Granulocytes 0.1 0 - 0.4 10e9/L    Absolute Nucleated RBC 0.0

## 2017-04-21 NOTE — PLAN OF CARE
"Problem: Goal Outcome Summary  Goal: Goal Outcome Summary  PT: Attempted to see pt for PT session.  Pt states \"I can't do anything until after I eat breakfast.\"; RN notified.  Will return later for eval      "

## 2017-04-21 NOTE — PLAN OF CARE
"Problem: Goal Outcome Summary  Goal: Goal Outcome Summary  OT: Orders received, eval completed. Pt s/p L4-S1 discectomy and fusion. Pt lives in split level home with spouse, walk in shower, shower chair, and standard toilets. Pt reports I with ADLs. Currently, Pt agreeable to OT, spouse present. Pt reports having 2 previous spinal surgeries. Pt able to doff/don socks with ortho technique with S/SBA. Spouse reports can A with LB dressing as needed. Pt has LSO, reports wearing it for years now- pt reports difficulty with finding straps when donning- OT provided education on technique, pt receptive. CGA with sit/stand and functional mobility into bathroom for walk in shower transfer. Pt required use of multiple grab bars, does not have at home. OT edcuated on alternative technique, provided demo and verbal/tactile cues. Pt repots his way is sufficient, educated on lack of grab bars at home. Pt not recpetive to education and reports that he will manage at home. Pt also declined to complete standard toilet transfer as her reports he is \"fine\". No further questions, OT to sign off as pt reports no further needs. Anticipate home with spouse. Recommend grab bars in shower and RTS as needed.      Occupational Therapy Discharge Summary     Reason for therapy discharge:    Pt reports no further needs for OT. Not receptive to education for increase functional transfer safety with bathing/toileting.      Progress towards therapy goal(s). See goals on Care Plan in [facility] electronic health record for goal details.  Goals partially met.  Barriers to achieving goals:   Not receptive to education for increased functional safety with bathing and toileting transfers..     Therapy recommendation(s):    No further therapy is recommended.             "

## 2017-04-21 NOTE — PROGRESS NOTES
04/21/17 1120   Quick Adds   Type of Visit Initial Occupational Therapy Evaluation   Living Environment   Lives With spouse   Living Arrangements house   Number of Stairs to Enter Home 2   Number of Stairs Within Home 8   Transportation Available car;family or friend will provide   Living Environment Comment Pt lives in split level home with spouse. Spouse able to provide A for ADLs/IADLs as needed.    Self-Care   Usual Activity Tolerance moderate   Current Activity Tolerance moderate   Regular Exercise no   Equipment Currently Used at Home walker, rolling;shower chair   Functional Level Prior   Ambulation 0-->independent   Transferring 0-->independent   Toileting 0-->independent   Bathing 1-->assistive equipment   Dressing 0-->independent   Eating 0-->independent   Communication 0-->understands/communicates without difficulty   Swallowing 0-->swallows foods/liquids without difficulty   Cognition 0 - no cognition issues reported   Fall history within last six months no   Prior Functional Level Comment Pt reports being I with ADLs, has 4ww for mobility. Uses shower chair in walk in shower, standard toilet with vanity.    General Information   Onset of Illness/Injury or Date of Surgery - Date 04/20/17   Referring Physician Sophie   Patient/Family Goals Statement d/c home today   Additional Occupational Profile Info/Pertinent History of Current Problem POD #1 s/p L4-S1 discectomy and fusion. Pt with previous 2 spinal surgeries per report.    Precautions/Limitations fall precautions;spinal precautions   General Observations Pt agreeable to OT, spouse present.    Cognitive Status Examination   Level of Consciousness alert   Able to Follow Commands mild impairment   Personal Safety (Cognitive) mild impairment   Cognitive Comment Pt is set in his ways and declines OT input on safety with transfers.    Pain Assessment   Patient Currently in Pain (1/10)   Strength   Strength Comments Not formally assessed, functional for  "activities.    Transfer Skill: Bed to Chair/Chair to Bed   Level of [place]: Bed to Chair contact guard   Physical Assist/Nonphysical Assist: Bed to Chair supervision;verbal cues   Assistive Device - Transfer Skill Bed to Chair Chair to Bed Rehab Eval standard walker   Transfer Skill: Sit to Stand   Level of [place]: Sit/Stand contact guard   Physical Assist/Nonphysical Assist: Sit/Stand supervision;verbal cues   Assistive Device for Transfer: Sit/Stand standard walker   Toilet Transfer   Toilet Transfer Comments declined to complete   Tub/Shower Transfer   Tub/Shower Transfer Comments walk in shower- CGA with cues for technique- pt did not follow.    Lower Body Dressing   Level of [place]: Dress Lower Body stand-by assist   Instrumental Activities of Daily Living (IADL)   IADL Comments Spouse to A with IADLs   Activities of Daily Living Analysis   Impairments Contributing to Impaired Activities of Daily Living balance impaired;cognition impaired;post surgical precautions   General Therapy Interventions   Planned Therapy Interventions ADL retraining;transfer training;risk factor education   Clinical Impression   Criteria for Skilled Therapeutic Interventions Met yes, treatment indicated   OT Diagnosis Impaired ADLs and functional mobility/transfers   Influenced by the following impairments Post surgical precautions, cognition/safety   Assessment of Occupational Performance 1-3 Performance Deficits   Identified Performance Deficits Toileting transfer, bathing transfer, safe functional mobility   Clinical Decision Making (Complexity) Low complexity   Therapy Frequency daily   Predicted Duration of Therapy Intervention (days/wks) 1x   Anticipated Equipment Needs at Discharge (grab bars)   Anticipated Discharge Disposition Home with Assist   Risks and Benefits of Treatment have been explained. Yes   Patient, Family & other staff in agreement with plan of care Yes   [facility] AM-PAC TM \"6 Clicks\"   " "2016, Trustees of [facility], under license to [employer].  All rights reserved.   6 Clicks Short Forms Daily Activity Inpatient Short Form   [facility] AM-PAC  \"6 Clicks\" Daily Activity Inpatient Short Form   1. Putting on and taking off regular lower body clothing? 3 - A Little   2. Bathing (including washing, rinsing, drying)? 3 - A Little   3. Toileting, which includes using toilet, bedpan or urinal? 3 - A Little   4. Putting on and taking off regular upper body clothing? 4 - None   5. Taking care of personal grooming such as brushing teeth? 3 - A Little   6. Eating meals? 4 - None   Daily Activity Raw Score (Score out of 24.Lower scores equate to lower levels of function) 20   Total Evaluation Time   Total Evaluation Time (Minutes) 10     "

## 2017-04-21 NOTE — PROGRESS NOTES
MD Page- HR is in the high 30's BP is low. Pt took all his BP meds before surgery today. Can we place him on tele?

## 2017-04-21 NOTE — PLAN OF CARE
Problem: Goal Outcome Summary  Goal: Goal Outcome Summary  Physical Therapy Discharge Summary     Reason for therapy discharge:    Discharged to home.     Progress towards therapy goal(s). See goals on Care Plan in [facility] electronic health record for goal details.  Goals met     Therapy recommendation(s):    Continue home exercise program.   Has a walker so no walker issued, has L drop foot  Ambulated 110'x2 with walker, stairs with 2 rails SBA

## 2017-04-21 NOTE — PROGRESS NOTES
SWS PROGRESS NOTE:     D/I: SW consulted for DC planning. Per cart review and discussion of pt during IDT rounds, pt is a  65 yo male who POD #1 s/p L4-S1 discectomy and fusion. Pt reports that he was independent with ADL's/IADL's prior to his surgery. Pt lives in split level with wife who will be around at discharge to assist as needed. Pt has a FWW and a shower chair available to him at home. Pt denies any other needs at DC.   A: Pt is A&O x4; has support and assistance from his wife on DC.   P: Pt will return home with wife for assistance. No SWS needs identified at this time.     Kendra Reyes, MSW, LGSW

## 2017-04-28 DIAGNOSIS — G89.18 ACUTE POST-OPERATIVE PAIN: ICD-10-CM

## 2017-04-28 NOTE — TELEPHONE ENCOUNTER
Norco 5-325mg      Last Written Prescription Date: 04/21/17  Last Fill Quantity: 60,  # refills: 0   Last Office Visit with [facility], [facility] or  Health prescribing provider: n/a    I am not sure if you guys will mail the hard copy to us [facility] at [facility]    Thank You,  Carmen Montesinos, Pharmacy Tech  [place]

## 2017-05-05 RX ORDER — HYDROCODONE BITARTRATE AND ACETAMINOPHEN 5; 325 MG/1; MG/1
1-2 TABLET ORAL EVERY 4 HOURS PRN
Qty: 60 TABLET | Refills: 0 | OUTPATIENT
Start: 2017-05-05

## 2017-05-16 NOTE — DISCHARGE SUMMARY
Discharge Summary    Attending Physician:  Murray Barrios MD  Admit Date: 4/20/2017    Discharge Date: 4/21/17  Primary Care Physician: Yoli Alatorre    Discharge Diagnoses  [unfilled]    Discharge Exam    AAOx3 ECHAVARRIA f/c all for , no weakness, No new sensory deficit, incision C/D/I        Preliminary Discharge Medications    This list of medications is preliminary and tentative.  Please see the After Visit Summary for the final and accurate medication list.    [unfilled]    Procedures Performed and Findings  Procedure(s):  L4-S1 Oblique Lateral Lumbar Interbody Fusion  Hardware removal of previous L3-L4 fusion  Epidural Steroid injection - Wound Class: I-Clean   - Wound Class: I-Clean       Consultations Obtained  HOSPITALIST IP CONSULT  OCCUPATIONAL THERAPY ADULT IP CONSULT  PHYSICAL THERAPY ADULT IP CONSULT  SOCIAL WORK IP CONSULT    Code Status   Prior    Discharge Disposition        Diet on Discharge   Regular    Activity on Discharge   Your activity upon discharge: Ad constance within following limitations:  No excessive activities   No Bending, Twisting, climbing, Crawling,   No lifting more than 8 lb for 2 weeks, or 15 lb for 2 months or 25 lb for 4 months or 35 lb for 6 months  Brace for riding cars for 4-6 months      Discharge Instructions  Per [facility] instruction : [email or web address]        Follow-Up Scheduled    Follow up in 2 weeks with me or PCP for wound check ( patient's choice) if patients want to go to PCP for wound check, then f/u in my office  In 3 months      Hospital Course   Hospital Course unremarkable , adequate ambulation in due time, pain controlled , cleared by PT/OT, no events         /c

## 2021-03-04 NOTE — IP AVS SNAPSHOT
[facility]    [address]    [facility] [address]    Phone:  [phone]    Fax:  [phone]                                       After Visit Summary   4/20/2017    Cody Mac    MRN: [id number]           After Visit Summary Signature Page     I have received my discharge instructions, and my questions have been answered. I have discussed any challenges I see with this plan with the nurse or doctor.    ..........................................................................................................................................  Patient/Patient Representative Signature      ..........................................................................................................................................  Patient Representative Print Name and Relationship to Patient    ..................................................               ................................................  Date                                            Time    ..........................................................................................................................................  Reviewed by Signature/Title    ...................................................              ..............................................  Date                                                            Time           Congolese

## (undated) DEVICE — STPL SKIN 35W APPOSE 8886803712

## (undated) DEVICE — SOL NACL 0.9% 20ML VIAL 4888-20

## (undated) DEVICE — LINEN ORTHO ACL PACK 5447

## (undated) DEVICE — PACK SMALL SPINE RIDGES

## (undated) DEVICE — Device

## (undated) DEVICE — GLOVE PROTEXIS POWDER FREE 8.0 ORTHOPEDIC 2D73ET80

## (undated) DEVICE — GLOVE PROTEXIS POWDER FREE SMT 8.0  2D72PT80X

## (undated) DEVICE — LINEN DRAPE 54X72" 5467

## (undated) DEVICE — GLOVE PROTEXIS POWDER FREE 6.5 ORTHOPEDIC 2D73ET65

## (undated) DEVICE — BLADE KNIFE SURG 11 371111

## (undated) DEVICE — DRAIN HEMOVAC RESERVOIR KIT 10FR 1/8" MED 00-2550-002-10

## (undated) DEVICE — SYR 10ML LL W/O NDL

## (undated) DEVICE — NDL SPINAL 18GA 3.5" 405184

## (undated) DEVICE — DRSG GAUZE 4X4" TRAY

## (undated) DEVICE — SYR 03ML LL W/O NDL 309657

## (undated) DEVICE — ESU ELEC BLADE 6" COATED E1450-6

## (undated) DEVICE — MIDAS REX DISSECTING TOOL  14MH30

## (undated) DEVICE — SU VICRYL 0 CT-1 CR 8X18" J740D

## (undated) DEVICE — SU MONOCRYL 4-0 PS-2 27" UND Y426H

## (undated) DEVICE — DRAPE C-ARM 60X42" 1013

## (undated) DEVICE — ESU GROUND PAD ADULT W/CORD E7507

## (undated) DEVICE — SYR 20ML LL W/O NDL 302830

## (undated) DEVICE — DRAPE MAYO STAND 23X54 8337

## (undated) DEVICE — CATH TRAY FOLEY COUDE SURESTEP 16FR W/DRN BAG LATEX A304416A

## (undated) DEVICE — BAG CLEAR TRASH 1.3M 39X33" P4040C

## (undated) DEVICE — SU VICRYL 2-0 CT-2 CR 8X18" J726D

## (undated) DEVICE — CATH IV ANGIO INTRO 12GA 382277

## (undated) DEVICE — K-WIRES

## (undated) DEVICE — ESU PENCIL W/HOLSTER E2350H

## (undated) DEVICE — SPONGE RAY-TEC 4X8" 7318

## (undated) DEVICE — CUSHION INSERT LG PRONE VIEW JACKSON TABLE

## (undated) DEVICE — SPONGE SURGIFOAM 01GM POWDER 1978

## (undated) DEVICE — DRSG STERI STRIP 1/2X4" R1547

## (undated) DEVICE — DRSG TELFA 3X8" 1238

## (undated) DEVICE — DRSG ABDOMINAL 07 1/2X8" 7197D

## (undated) DEVICE — DRAPE IOBAN ISOLATION VERTICAL 6619

## (undated) DEVICE — PREP DURAPREP 26ML APL 8630

## (undated) RX ORDER — GLYCOPYRROLATE 0.2 MG/ML
INJECTION INTRAMUSCULAR; INTRAVENOUS
Status: DISPENSED
Start: 2017-04-20

## (undated) RX ORDER — ONDANSETRON 2 MG/ML
INJECTION INTRAMUSCULAR; INTRAVENOUS
Status: DISPENSED
Start: 2017-04-20

## (undated) RX ORDER — CEFAZOLIN SODIUM 2 G/100ML
INJECTION, SOLUTION INTRAVENOUS
Status: DISPENSED
Start: 2017-04-20

## (undated) RX ORDER — EPHEDRINE SULFATE 50 MG/ML
INJECTION, SOLUTION INTRAMUSCULAR; INTRAVENOUS; SUBCUTANEOUS
Status: DISPENSED
Start: 2017-04-20

## (undated) RX ORDER — HYDROMORPHONE HYDROCHLORIDE 1 MG/ML
INJECTION, SOLUTION INTRAMUSCULAR; INTRAVENOUS; SUBCUTANEOUS
Status: DISPENSED
Start: 2017-04-20

## (undated) RX ORDER — FENTANYL CITRATE 50 UG/ML
INJECTION, SOLUTION INTRAMUSCULAR; INTRAVENOUS
Status: DISPENSED
Start: 2017-04-20

## (undated) RX ORDER — PROPOFOL 10 MG/ML
INJECTION, EMULSION INTRAVENOUS
Status: DISPENSED
Start: 2017-04-20

## (undated) RX ORDER — NEOSTIGMINE METHYLSULFATE 5 MG/5 ML
SYRINGE (ML) INTRAVENOUS
Status: DISPENSED
Start: 2017-04-20

## (undated) RX ORDER — DEXAMETHASONE SODIUM PHOSPHATE 4 MG/ML
INJECTION, SOLUTION INTRA-ARTICULAR; INTRALESIONAL; INTRAMUSCULAR; INTRAVENOUS; SOFT TISSUE
Status: DISPENSED
Start: 2017-04-20

## (undated) RX ORDER — LIDOCAINE HYDROCHLORIDE 10 MG/ML
INJECTION, SOLUTION EPIDURAL; INFILTRATION; INTRACAUDAL; PERINEURAL
Status: DISPENSED
Start: 2017-04-20